# Patient Record
Sex: FEMALE | Race: WHITE | Employment: UNEMPLOYED | URBAN - NONMETROPOLITAN AREA
[De-identification: names, ages, dates, MRNs, and addresses within clinical notes are randomized per-mention and may not be internally consistent; named-entity substitution may affect disease eponyms.]

---

## 2018-08-04 ENCOUNTER — HOSPITAL ENCOUNTER (INPATIENT)
Age: 78
LOS: 6 days | Discharge: HOME | DRG: 439 | End: 2018-08-10
Attending: INTERNAL MEDICINE | Admitting: INTERNAL MEDICINE
Payer: MEDICARE

## 2018-08-04 PROBLEM — K85.90 ACUTE PANCREATITIS: Status: ACTIVE | Noted: 2018-08-04

## 2018-08-04 LAB
ALBUMIN SERPL-MCNC: 3.8 G/DL (ref 3.5–5.1)
ALP BLD-CCNC: 156 U/L (ref 38–126)
ALT SERPL-CCNC: 19 U/L (ref 11–66)
ANION GAP SERPL CALCULATED.3IONS-SCNC: 15 MEQ/L (ref 8–16)
AST SERPL-CCNC: 23 U/L (ref 5–40)
BASOPHILS # BLD: 0.6 %
BASOPHILS ABSOLUTE: 0 THOU/MM3 (ref 0–0.1)
BILIRUB SERPL-MCNC: 0.2 MG/DL (ref 0.3–1.2)
BUN BLDV-MCNC: 13 MG/DL (ref 7–22)
CALCIUM IONIZED: 1.2 MMOL/L (ref 1.12–1.32)
CALCIUM SERPL-MCNC: 9.9 MG/DL (ref 8.5–10.5)
CHLORIDE BLD-SCNC: 101 MEQ/L (ref 98–111)
CO2: 24 MEQ/L (ref 23–33)
CREAT SERPL-MCNC: 0.8 MG/DL (ref 0.4–1.2)
EKG ATRIAL RATE: 62 BPM
EKG P AXIS: 32 DEGREES
EKG P-R INTERVAL: 216 MS
EKG Q-T INTERVAL: 404 MS
EKG QRS DURATION: 72 MS
EKG QTC CALCULATION (BAZETT): 410 MS
EKG R AXIS: 8 DEGREES
EKG T AXIS: 35 DEGREES
EKG VENTRICULAR RATE: 62 BPM
EOSINOPHIL # BLD: 2.5 %
EOSINOPHILS ABSOLUTE: 0.2 THOU/MM3 (ref 0–0.4)
ERYTHROCYTE [DISTWIDTH] IN BLOOD BY AUTOMATED COUNT: 13.9 % (ref 11.5–14.5)
ERYTHROCYTE [DISTWIDTH] IN BLOOD BY AUTOMATED COUNT: 42 FL (ref 35–45)
GFR SERPL CREATININE-BSD FRML MDRD: 70 ML/MIN/1.73M2
GLUCOSE BLD-MCNC: 89 MG/DL (ref 70–108)
HCT VFR BLD CALC: 35.8 % (ref 37–47)
HEMOGLOBIN: 12 GM/DL (ref 12–16)
IMMATURE GRANS (ABS): 0.02 THOU/MM3 (ref 0–0.07)
IMMATURE GRANULOCYTES: 0.3 %
LIPASE: 359.7 U/L (ref 5.6–51.3)
LYMPHOCYTES # BLD: 41 %
LYMPHOCYTES ABSOLUTE: 3 THOU/MM3 (ref 1–4.8)
MCH RBC QN AUTO: 27.9 PG (ref 26–33)
MCHC RBC AUTO-ENTMCNC: 33.5 GM/DL (ref 32.2–35.5)
MCV RBC AUTO: 83.3 FL (ref 81–99)
MONOCYTES # BLD: 8.8 %
MONOCYTES ABSOLUTE: 0.6 THOU/MM3 (ref 0.4–1.3)
NUCLEATED RED BLOOD CELLS: 0 /100 WBC
PLATELET # BLD: 388 THOU/MM3 (ref 130–400)
PMV BLD AUTO: 10.3 FL (ref 9.4–12.4)
POTASSIUM REFLEX MAGNESIUM: 4.2 MEQ/L (ref 3.5–5.2)
RBC # BLD: 4.3 MILL/MM3 (ref 4.2–5.4)
SEG NEUTROPHILS: 46.8 %
SEGMENTED NEUTROPHILS ABSOLUTE COUNT: 3.4 THOU/MM3 (ref 1.8–7.7)
SODIUM BLD-SCNC: 140 MEQ/L (ref 135–145)
TOTAL PROTEIN: 7.4 G/DL (ref 6.1–8)
TRIGL SERPL-MCNC: 154 MG/DL (ref 0–199)
TROPONIN T: < 0.01 NG/ML
WBC # BLD: 7.2 THOU/MM3 (ref 4.8–10.8)

## 2018-08-04 PROCEDURE — 83690 ASSAY OF LIPASE: CPT

## 2018-08-04 PROCEDURE — 6360000002 HC RX W HCPCS: Performed by: INTERNAL MEDICINE

## 2018-08-04 PROCEDURE — 80053 COMPREHEN METABOLIC PANEL: CPT

## 2018-08-04 PROCEDURE — 82330 ASSAY OF CALCIUM: CPT

## 2018-08-04 PROCEDURE — 99223 1ST HOSP IP/OBS HIGH 75: CPT | Performed by: INTERNAL MEDICINE

## 2018-08-04 PROCEDURE — 2580000003 HC RX 258: Performed by: INTERNAL MEDICINE

## 2018-08-04 PROCEDURE — 84484 ASSAY OF TROPONIN QUANT: CPT

## 2018-08-04 PROCEDURE — 85025 COMPLETE CBC W/AUTO DIFF WBC: CPT

## 2018-08-04 PROCEDURE — 36415 COLL VENOUS BLD VENIPUNCTURE: CPT

## 2018-08-04 PROCEDURE — 84478 ASSAY OF TRIGLYCERIDES: CPT

## 2018-08-04 PROCEDURE — 93005 ELECTROCARDIOGRAM TRACING: CPT | Performed by: INTERNAL MEDICINE

## 2018-08-04 PROCEDURE — 1200000003 HC TELEMETRY R&B

## 2018-08-04 PROCEDURE — 6370000000 HC RX 637 (ALT 250 FOR IP): Performed by: INTERNAL MEDICINE

## 2018-08-04 RX ORDER — PROPRANOLOL HYDROCHLORIDE 20 MG/1
80 TABLET ORAL EVERY MORNING
Status: DISCONTINUED | OUTPATIENT
Start: 2018-08-05 | End: 2018-08-10 | Stop reason: HOSPADM

## 2018-08-04 RX ORDER — TRIAMTERENE AND HYDROCHLOROTHIAZIDE 37.5; 25 MG/1; MG/1
1 TABLET ORAL DAILY
Status: ON HOLD | COMMUNITY
End: 2018-08-09

## 2018-08-04 RX ORDER — M-VIT,TX,IRON,MINS/CALC/FOLIC 27MG-0.4MG
1 TABLET ORAL DAILY
Status: DISCONTINUED | OUTPATIENT
Start: 2018-08-05 | End: 2018-08-10 | Stop reason: HOSPADM

## 2018-08-04 RX ORDER — M-VIT,TX,IRON,MINS/CALC/FOLIC 27MG-0.4MG
1 TABLET ORAL DAILY
COMMUNITY

## 2018-08-04 RX ORDER — SODIUM CHLORIDE 0.9 % (FLUSH) 0.9 %
10 SYRINGE (ML) INJECTION PRN
Status: DISCONTINUED | OUTPATIENT
Start: 2018-08-04 | End: 2018-08-10 | Stop reason: HOSPADM

## 2018-08-04 RX ORDER — TRAZODONE HYDROCHLORIDE 50 MG/1
50 TABLET ORAL NIGHTLY
Status: DISCONTINUED | OUTPATIENT
Start: 2018-08-04 | End: 2018-08-10 | Stop reason: HOSPADM

## 2018-08-04 RX ORDER — MORPHINE SULFATE 4 MG/ML
4 INJECTION, SOLUTION INTRAMUSCULAR; INTRAVENOUS EVERY 4 HOURS PRN
Status: DISCONTINUED | OUTPATIENT
Start: 2018-08-04 | End: 2018-08-10 | Stop reason: HOSPADM

## 2018-08-04 RX ORDER — CHLORAL HYDRATE 500 MG
1 CAPSULE ORAL DAILY
COMMUNITY

## 2018-08-04 RX ORDER — ONDANSETRON 2 MG/ML
4 INJECTION INTRAMUSCULAR; INTRAVENOUS EVERY 6 HOURS PRN
Status: DISCONTINUED | OUTPATIENT
Start: 2018-08-04 | End: 2018-08-10 | Stop reason: HOSPADM

## 2018-08-04 RX ORDER — SODIUM CHLORIDE 0.9 % (FLUSH) 0.9 %
10 SYRINGE (ML) INJECTION EVERY 12 HOURS SCHEDULED
Status: DISCONTINUED | OUTPATIENT
Start: 2018-08-04 | End: 2018-08-10 | Stop reason: HOSPADM

## 2018-08-04 RX ORDER — MORPHINE SULFATE 2 MG/ML
2 INJECTION, SOLUTION INTRAMUSCULAR; INTRAVENOUS EVERY 4 HOURS PRN
Status: DISCONTINUED | OUTPATIENT
Start: 2018-08-04 | End: 2018-08-10 | Stop reason: HOSPADM

## 2018-08-04 RX ORDER — TRAMADOL HYDROCHLORIDE 50 MG/1
50 TABLET ORAL EVERY 6 HOURS PRN
COMMUNITY

## 2018-08-04 RX ORDER — PROPRANOLOL HYDROCHLORIDE 20 MG/1
40 TABLET ORAL NIGHTLY
Status: DISCONTINUED | OUTPATIENT
Start: 2018-08-04 | End: 2018-08-10 | Stop reason: HOSPADM

## 2018-08-04 RX ORDER — LORAZEPAM 0.5 MG/1
0.5 TABLET ORAL DAILY PRN
COMMUNITY

## 2018-08-04 RX ORDER — ACETAMINOPHEN 325 MG/1
650 TABLET ORAL EVERY 4 HOURS PRN
Status: DISCONTINUED | OUTPATIENT
Start: 2018-08-04 | End: 2018-08-10 | Stop reason: HOSPADM

## 2018-08-04 RX ORDER — PRIMIDONE 50 MG/1
100 TABLET ORAL NIGHTLY
Status: DISCONTINUED | OUTPATIENT
Start: 2018-08-04 | End: 2018-08-10 | Stop reason: HOSPADM

## 2018-08-04 RX ADMIN — PROPRANOLOL HYDROCHLORIDE 40 MG: 20 TABLET ORAL at 20:26

## 2018-08-04 RX ADMIN — Medication 10 ML: at 20:33

## 2018-08-04 RX ADMIN — MORPHINE SULFATE 4 MG: 4 INJECTION INTRAVENOUS at 20:26

## 2018-08-04 RX ADMIN — TRAZODONE HYDROCHLORIDE 50 MG: 50 TABLET ORAL at 20:26

## 2018-08-04 RX ADMIN — ENOXAPARIN SODIUM 40 MG: 40 INJECTION SUBCUTANEOUS at 20:33

## 2018-08-04 RX ADMIN — VITAMIN D, TAB 1000IU (100/BT) 5000 UNITS: 25 TAB at 20:26

## 2018-08-04 RX ADMIN — PRIMIDONE 100 MG: 50 TABLET ORAL at 20:26

## 2018-08-04 ASSESSMENT — PAIN DESCRIPTION - PAIN TYPE: TYPE: ACUTE PAIN

## 2018-08-04 ASSESSMENT — PAIN DESCRIPTION - ONSET: ONSET: ON-GOING

## 2018-08-04 ASSESSMENT — PAIN SCALES - GENERAL
PAINLEVEL_OUTOF10: 3
PAINLEVEL_OUTOF10: 8
PAINLEVEL_OUTOF10: 8

## 2018-08-04 ASSESSMENT — PAIN DESCRIPTION - FREQUENCY: FREQUENCY: CONTINUOUS

## 2018-08-04 ASSESSMENT — PAIN DESCRIPTION - LOCATION: LOCATION: BACK

## 2018-08-04 ASSESSMENT — PAIN DESCRIPTION - DESCRIPTORS: DESCRIPTORS: ACHING

## 2018-08-04 ASSESSMENT — PAIN DESCRIPTION - PROGRESSION: CLINICAL_PROGRESSION: NOT CHANGED

## 2018-08-04 ASSESSMENT — PAIN DESCRIPTION - ORIENTATION: ORIENTATION: LOWER;MID

## 2018-08-04 NOTE — H&P
History & Physical        Patient:  Cynthia Keller  YOB: 1940    MRN: [id number]     Acct: [de-identified]    PCP: Lexy Mclaughlin MD    Date of Admission: 8/4/2018    Date of Service: Pt seen/examined on 8/4/18 and Admitted to Inpatient with expected LOS greater than two midnights due to medical therapy. Chief Complaint:  Post-prandial back pain, elevated lipase      History Of Present Illness:  Ms. Olivia Salmon is a 78year old female with a past medical history of essential tremor s/p DBS 2014, nephrolithiasis, osteoporosis who presents as an OSH transfer from [facility] where she was being treated for acute pancreatitis. Her symptoms started on Thursday where she had worsening back pain severely in the evening hours. She took vicodin which did not provide relief. She thus presented to the  ED the following morning. Initial concerns were for nephrolithiasis given her history and right sided flank pain, however, UA was normal and CT AP did not demonstrate any stone. Lipase was mildly elevated at 380, thus without any other etiology they began empiric treatment for pancreatitis. She does not drink etoh, no family history of hypertriglyceridemia, no history of gall stones. Her lipase improved steadily, however, when they attempted to start a liquid diet her back pain returned significantly. They planned to do an MRCP, however, she has hardware that isn't MRI compatible. Thus she has been transferred here for GI eval and possible ERCP. She states that she takes naproxen daily. She denies any nausea or diaphoresis. She has lost 8 lbs recently, however, intentionally.      Past Medical History:          Diagnosis Date    Acute pancreatitis     Cervical dystonia     Receives Botox at [facility] Essential tremor     DBS    Fall as cause of accidental injury in home as place of occurrence     Kidney stone     Multiple transverse process fractures     right L1 and L3 transverse processes

## 2018-08-04 NOTE — PROGRESS NOTES
Patient to room from [facility] via squad at this time. Family at bedside. Patient alert and oriented x 3 with respirations easy and even. See vitals flow sheet and head to toe assessment.

## 2018-08-05 LAB
ALBUMIN SERPL-MCNC: 3.6 G/DL (ref 3.5–5.1)
ALP BLD-CCNC: 135 U/L (ref 38–126)
ALT SERPL-CCNC: 16 U/L (ref 11–66)
ANION GAP SERPL CALCULATED.3IONS-SCNC: 13 MEQ/L (ref 8–16)
AST SERPL-CCNC: 19 U/L (ref 5–40)
BACTERIA: ABNORMAL
BASOPHILS # BLD: 1 %
BASOPHILS ABSOLUTE: 0 THOU/MM3 (ref 0–0.1)
BILIRUB SERPL-MCNC: 0.2 MG/DL (ref 0.3–1.2)
BILIRUBIN URINE: ABNORMAL
BLOOD, URINE: NEGATIVE
BUN BLDV-MCNC: 15 MG/DL (ref 7–22)
CALCIUM SERPL-MCNC: 9.9 MG/DL (ref 8.5–10.5)
CASTS: ABNORMAL /LPF
CASTS: ABNORMAL /LPF
CHARACTER, URINE: CLEAR
CHLORIDE BLD-SCNC: 102 MEQ/L (ref 98–111)
CO2: 25 MEQ/L (ref 23–33)
COLOR: YELLOW
CREAT SERPL-MCNC: 0.7 MG/DL (ref 0.4–1.2)
CRYSTALS: ABNORMAL
EOSINOPHIL # BLD: 3.5 %
EOSINOPHILS ABSOLUTE: 0.2 THOU/MM3 (ref 0–0.4)
EPITHELIAL CELLS, UA: ABNORMAL /HPF
ERYTHROCYTE [DISTWIDTH] IN BLOOD BY AUTOMATED COUNT: 14 % (ref 11.5–14.5)
ERYTHROCYTE [DISTWIDTH] IN BLOOD BY AUTOMATED COUNT: 43.2 FL (ref 35–45)
GFR SERPL CREATININE-BSD FRML MDRD: 81 ML/MIN/1.73M2
GLUCOSE BLD-MCNC: 83 MG/DL (ref 70–108)
GLUCOSE, URINE: NEGATIVE MG/DL
HCT VFR BLD CALC: 32.4 % (ref 37–47)
HEMOGLOBIN: 10.7 GM/DL (ref 12–16)
ICTOTEST: NEGATIVE
IMMATURE GRANS (ABS): 0.01 THOU/MM3 (ref 0–0.07)
IMMATURE GRANULOCYTES: 0.2 %
KETONES, URINE: 40
LEUKOCYTE ESTERASE, URINE: NEGATIVE
LYMPHOCYTES # BLD: 46.2 %
LYMPHOCYTES ABSOLUTE: 2.3 THOU/MM3 (ref 1–4.8)
MCH RBC QN AUTO: 27.9 PG (ref 26–33)
MCHC RBC AUTO-ENTMCNC: 33 GM/DL (ref 32.2–35.5)
MCV RBC AUTO: 84.4 FL (ref 81–99)
MISCELLANEOUS LAB TEST RESULT: ABNORMAL
MONOCYTES # BLD: 9 %
MONOCYTES ABSOLUTE: 0.4 THOU/MM3 (ref 0.4–1.3)
NITRITE, URINE: NEGATIVE
NUCLEATED RED BLOOD CELLS: 0 /100 WBC
PH UA: 5
PLATELET # BLD: 319 THOU/MM3 (ref 130–400)
PMV BLD AUTO: 10.2 FL (ref 9.4–12.4)
POTASSIUM REFLEX MAGNESIUM: 4.1 MEQ/L (ref 3.5–5.2)
PROTEIN UA: NEGATIVE MG/DL
RBC # BLD: 3.84 MILL/MM3 (ref 4.2–5.4)
RBC URINE: ABNORMAL /HPF
RENAL EPITHELIAL, UA: ABNORMAL
SEG NEUTROPHILS: 40.1 %
SEGMENTED NEUTROPHILS ABSOLUTE COUNT: 2 THOU/MM3 (ref 1.8–7.7)
SODIUM BLD-SCNC: 140 MEQ/L (ref 135–145)
SPECIFIC GRAVITY UA: 1.02 (ref 1–1.03)
TOTAL PROTEIN: 6.3 G/DL (ref 6.1–8)
UROBILINOGEN, URINE: 0.2 EU/DL
WBC # BLD: 4.9 THOU/MM3 (ref 4.8–10.8)
WBC UA: ABNORMAL /HPF
YEAST: ABNORMAL

## 2018-08-05 PROCEDURE — 6370000000 HC RX 637 (ALT 250 FOR IP): Performed by: INTERNAL MEDICINE

## 2018-08-05 PROCEDURE — 2580000003 HC RX 258: Performed by: INTERNAL MEDICINE

## 2018-08-05 PROCEDURE — 1200000003 HC TELEMETRY R&B

## 2018-08-05 PROCEDURE — 81001 URINALYSIS AUTO W/SCOPE: CPT

## 2018-08-05 PROCEDURE — 80053 COMPREHEN METABOLIC PANEL: CPT

## 2018-08-05 PROCEDURE — 85025 COMPLETE CBC W/AUTO DIFF WBC: CPT

## 2018-08-05 PROCEDURE — 93010 ELECTROCARDIOGRAM REPORT: CPT | Performed by: INTERNAL MEDICINE

## 2018-08-05 PROCEDURE — 36415 COLL VENOUS BLD VENIPUNCTURE: CPT

## 2018-08-05 PROCEDURE — 99232 SBSQ HOSP IP/OBS MODERATE 35: CPT | Performed by: INTERNAL MEDICINE

## 2018-08-05 PROCEDURE — 6360000002 HC RX W HCPCS: Performed by: INTERNAL MEDICINE

## 2018-08-05 RX ORDER — SODIUM CHLORIDE, SODIUM LACTATE, POTASSIUM CHLORIDE, CALCIUM CHLORIDE 600; 310; 30; 20 MG/100ML; MG/100ML; MG/100ML; MG/100ML
INJECTION, SOLUTION INTRAVENOUS CONTINUOUS
Status: DISCONTINUED | OUTPATIENT
Start: 2018-08-05 | End: 2018-08-08

## 2018-08-05 RX ADMIN — Medication 10 ML: at 22:10

## 2018-08-05 RX ADMIN — SODIUM CHLORIDE, POTASSIUM CHLORIDE, SODIUM LACTATE AND CALCIUM CHLORIDE: 600; 310; 30; 20 INJECTION, SOLUTION INTRAVENOUS at 08:46

## 2018-08-05 RX ADMIN — VITAMIN D, TAB 1000IU (100/BT) 2000 UNITS: 25 TAB at 08:54

## 2018-08-05 RX ADMIN — TRAZODONE HYDROCHLORIDE 50 MG: 50 TABLET ORAL at 22:10

## 2018-08-05 RX ADMIN — Medication 10 ML: at 09:00

## 2018-08-05 RX ADMIN — PROPRANOLOL HYDROCHLORIDE 80 MG: 20 TABLET ORAL at 08:53

## 2018-08-05 RX ADMIN — PROPRANOLOL HYDROCHLORIDE 40 MG: 20 TABLET ORAL at 22:10

## 2018-08-05 RX ADMIN — PRIMIDONE 100 MG: 50 TABLET ORAL at 22:09

## 2018-08-05 RX ADMIN — SERTRALINE 150 MG: 100 TABLET, FILM COATED ORAL at 08:53

## 2018-08-05 RX ADMIN — ENOXAPARIN SODIUM 40 MG: 40 INJECTION SUBCUTANEOUS at 13:30

## 2018-08-05 RX ADMIN — VITAMIN D, TAB 1000IU (100/BT) 5000 UNITS: 25 TAB at 22:09

## 2018-08-05 RX ADMIN — SODIUM CHLORIDE, POTASSIUM CHLORIDE, SODIUM LACTATE AND CALCIUM CHLORIDE: 600; 310; 30; 20 INJECTION, SOLUTION INTRAVENOUS at 22:54

## 2018-08-05 RX ADMIN — SODIUM CHLORIDE, POTASSIUM CHLORIDE, SODIUM LACTATE AND CALCIUM CHLORIDE: 600; 310; 30; 20 INJECTION, SOLUTION INTRAVENOUS at 16:39

## 2018-08-05 ASSESSMENT — PAIN SCALES - GENERAL
PAINLEVEL_OUTOF10: 0

## 2018-08-05 NOTE — PLAN OF CARE
Problem: Pain:  Goal: Pain level will decrease  Pain level will decrease   Outcome: Ongoing  No pain this shift, No nausea  Goal: Control of acute pain  Control of acute pain   Outcome: Met This Shift    Goal: Control of chronic pain  Control of chronic pain   Outcome: Met This Shift    Goal: Patient's pain/discomfort is manageable  Patient's pain/discomfort is manageable   Outcome: Met This Shift      Problem: Safety:  Goal: Free from accidental physical injury  Free from accidental physical injury   Outcome: Ongoing  Fall prevention taken     Problem: Daily Care:  Goal: Daily care needs are met  Daily care needs are met   Outcome: Ongoing  Care needs met, Assitance with bath and care as needed     Problem: Skin Integrity:  Goal: Skin integrity will stabilize  Skin integrity will stabilize   Outcome: Met This Shift      Problem: Discharge Planning:  Goal: Patients continuum of care needs are met  Patients continuum of care needs are met   Outcome: Ongoing  Continued with care needs. Family supportive     Problem: Neurological  Goal: Maximum potential motor/sensory/cognitive function  Outcome: Ongoing      Problem: Cardiovascular  Goal: No DVT, peripheral vascular complications  Outcome: Ongoing      Problem: Respiratory  Goal: No pulmonary complications  Outcome: Ongoing  No shortness of breath. Problem: Nutrition  Goal: Optimal nutrition therapy  Outcome: Ongoing  Npo except ice chips    Comments: Continued with care .  Npo at this time

## 2018-08-05 NOTE — CONSULTS
Consult Note    Patient: Kendra Salazar : 1940  Med Rec#: [id number] Acc#: [id number]   Provider Performing Procedure: Eugenia Spnan  Primary Care Physician: Josef Florence MD     No chief complaint on file. Consulted by the hospitalist for pancreatitis and patient was transferred to the hospital for possible ERCP. Patient had colonoscopy two years ago by another GI specialist but requested to be seen by me in the presence of nurse Jenn Santizo . Patient has epigastric pain radiating to the back , denied fever or chills , diarrhea , jaundice , weight loss or other GI complains . ROS  General Denies any fever or chills  HEENT Denies any diplopia, tinnitus or vertigo  Resp Denies SOB, chest pain, hemoptysis  Cardiac Denies any chest pain, palpitations, claudication or edema  GI Denies any melena, hematochezia, hematemesis or pyrosis   Denies any frequency, urgency, hesitancy or incontinence  Heme Denies bruising or bleeding easily  Endocrine Denies any history of diabetes or thyroid disease  Neuro Denies any focal motor or sensory deficits  Psychiatric Denies anxiety, depression, suicidal ideation  Skin Denies rashes, itching, open sores    MEDICAL HISTORY   has a past medical history of Acute pancreatitis; Cervical dystonia; Essential tremor; Fall as cause of accidental injury in home as place of occurrence; Kidney stone; Multiple transverse process fractures; Osteoporosis; Right rib fracture; and Vertebral compression fracture. SURGICAL HISTORY   has a past surgical history that includes Hysterectomy; Total knee arthroplasty (Bilateral); Deep brain stimulator placement; Tonsillectomy; and joint replacement (Bilateral).   Additional information:       ALLERGIES   Allergies as of 2018 - Review Complete 2018   Allergen Reaction Noted    Shellfish-derived products  2018     Additional information:       MEDICATIONS   Coumadin Use Last 7 Days [x]No []Yes  Antiplatelet drug therapy use last 7 days  [x]No []Yes  Other anticoagulant use last 7 days  [x]No []Yes  Current Facility-Administered Medications   Medication Dose Route Frequency Provider Last Rate Last Dose    lactated ringers infusion   Intravenous Continuous Gayathri Walsh  mL/hr at 08/05/18 1639      sodium chloride flush 0.9 % injection 10 mL  10 mL Intravenous 2 times per day Gayathri Walsh MD   10 mL at 08/05/18 0900    sodium chloride flush 0.9 % injection 10 mL  10 mL Intravenous PRN Gayathri Walsh MD        acetaminophen (TYLENOL) tablet 650 mg  650 mg Oral Q4H PRN aGyathri Walsh MD        ondansetron [facility]) injection 4 mg  4 mg Intravenous Q6H PRN Gayathri Walsh MD        enoxaparin (LOVENOX) injection 40 mg  40 mg Subcutaneous Daily Gayathri Walsh MD   40 mg at 08/05/18 1330    morphine injection 2 mg  2 mg Intravenous Q4H PRN Gayathri Walsh MD        Or    morphine (PF) injection 4 mg  4 mg Intravenous Q4H PRN Gayathri Walsh MD   4 mg at 08/04/18 2026    therapeutic multivitamin-minerals 1 tablet  1 tablet Oral Daily Gayathri Walsh MD        primidone (MYSOLINE) tablet 100 mg  100 mg Oral Nightly Gayathri Walsh MD   100 mg at 08/04/18 2026    propranolol (INDERAL) tablet 80 mg  80 mg Oral QAM Gayathri Walsh MD   80 mg at 08/05/18 8796    traZODone (DESYREL) tablet 50 mg  50 mg Oral Nightly Gayathri Walsh MD   50 mg at 08/04/18 2026    vitamin D (CHOLECALCIFEROL) tablet 5,000 Units  5,000 Units Oral BID Gayathri Walsh MD   2,000 Units at 08/05/18 0854    sertraline (ZOLOFT) tablet 150 mg  150 mg Oral Daily Gayathri Walsh MD   150 mg at 08/05/18 3469    propranolol (INDERAL) tablet 40 mg  40 mg Oral Nightly Gayathri aWlsh MD   40 mg at 08/04/18 2026     Additional information:       PHYSICAL:   Heart:  [x]Regular rate and rhythm  []Other:    Lungs:  [x]Clear    []Other:    Abdomen: [x]Soft    []Other:    Mental Status: [x]Alert &

## 2018-08-05 NOTE — PROGRESS NOTES
Hospitalist Progress Note    Patient:  Josefina Dumont      Unit/Bed:6K-21/021-A    YOB: 1940    MRN: [id number]       Acct: [de-identified]     PCP: Kelly Brambila MD    Date of Admission: 8/4/2018    Chief Complaint: Back pain, stomach fullness    Hospital Course: Ms. Forrest Posada is a 78year old female with a past medical history of essential tremor s/p DBS 2014, nephrolithiasis, osteoporosis who presents as an OSH transfer from [facility] where she was being treated for acute pancreatitis. Her pain returned on resumption of diet and since she couldn't undergo MRCP due to hardware she was transferred to [facility] for for endoscopic evaluation by GI. Subjective: Some back pain overnight treated with morphine. Lipase elevated to 359. No other complaints. Medications:  Reviewed    Infusion Medications    lactated ringers 125 mL/hr at 08/05/18 1639     Scheduled Medications    sodium chloride flush  10 mL Intravenous 2 times per day    enoxaparin  40 mg Subcutaneous Daily    therapeutic multivitamin-minerals  1 tablet Oral Daily    primidone  100 mg Oral Nightly    propranolol  80 mg Oral QAM    traZODone  50 mg Oral Nightly    vitamin D  5,000 Units Oral BID    sertraline  150 mg Oral Daily    propranolol  40 mg Oral Nightly     PRN Meds: sodium chloride flush, acetaminophen, ondansetron, morphine **OR** morphine      Intake/Output Summary (Last 24 hours) at 08/05/18 1708  Last data filed at 08/05/18 1448   Gross per 24 hour   Intake              775 ml   Output              500 ml   Net              275 ml       Diet:  Diet NPO Effective Now Exceptions are: Ice Chips    Exam:  BP (!) 141/65   Pulse 55   Temp 98.1 °F (36.7 °C) (Oral)   Resp 16   Ht 5' 6\" (1.676 m)   Wt 164 lb 1.6 oz (74.4 kg)   LMP  (LMP Unknown)   SpO2 97%   BMI 26.49 kg/m²     General appearance:  No apparent distress, appears stated age and cooperative.   HEENT:  Normal cephalic, atraumatic without obvious deformity. Pupils equal, round, and reactive to light. Extra ocular muscles intact. Conjunctivae/corneas clear. Neck: Supple, with full range of motion. No jugular venous distention. Trachea midline. Respiratory:  Normal respiratory effort. Clear to auscultation, bilaterally without Rales/Wheezes/Rhonchi. Cardiovascular:  Regular rate and rhythm with normal S1/S2 without murmurs, rubs or gallops. Abdomen: Soft, non-tender, non-distended with normal bowel sounds. Musculoskeletal:  No clubbing, cyanosis or edema bilaterally. Full range of motion without deformity. Skin: Skin color, texture, turgor normal.  No rashes or lesions. Neurologic:  Neurovascularly intact without any focal sensory/motor deficits. Cranial nerves: II-XII intact, grossly non-focal. Head with bobbing tremor. Psychiatric:  Alert and oriented, thought content appropriate, normal insight  Capillary Refill: Brisk,< 3 seconds   Peripheral Pulses: +2 palpable, equal bilaterally          Labs:   Recent Labs      08/04/18 1906 08/05/18 0527   WBC  7.2  4.9   HGB  12.0  10.7*   HCT  35.8*  32.4*   PLT  388  319     Recent Labs      08/04/18 1906 08/05/18 0527   NA  140  140   K  4.2  4.1   CL  101  102   CO2  24  25   BUN  13  15   CREATININE  0.8  0.7   CALCIUM  9.9  9.9     Recent Labs      08/04/18 1906 08/05/18 0527   AST  23  19   ALT  19  16   BILITOT  0.2*  0.2*   ALKPHOS  156*  135*     No results for input(s): INR in the last 72 hours. No results for input(s): Deward Feil in the last 72 hours.     Urinalysis:    Lab Results   Component Value Date    NITRU NEGATIVE 08/05/2018    WBCUA 0-2 08/05/2018    BACTERIA NONE 08/05/2018    RBCUA 0-2 08/05/2018    BLOODU NEGATIVE 08/05/2018    SPECGRAV 1.016 08/05/2018       Radiology:  No orders to display       Diet: Diet NPO Effective Now Exceptions are: Ice Chips    DVT prophylaxis: [x] Lovenox                                 [] SCDs [] SQ Heparin                                 [] Encourage ambulation           [] Already on Anticoagulation     Disposition:    [x] Home       [] TCU       [] Rehab       [] Psych       [] SNF       [] [place]       [] Other-    Code Status: Full Code    Assessment/Plan:    Anticipated Discharge in : 2-3 days    Active Hospital Problems    Diagnosis Date Noted    Acute pancreatitis [K85.90] 08/04/2018    Kidney stone [N20.0]     Osteoporosis [M81.0]        Acute Pancreatitis: Would suspect nephrolithiasis given right flank pan, however, OSH CTAP unremarkable for stone and UA without blood and unlikely uremic stone. Pain seems to be related to PO intake. Lipase elevated to 380 at OSH and elevated again here to 359. Unable to do MRCP at OSH due to hardware. OSH RUQ US reportedly with GB sludge. Also takes NSAIDs daily so query gastritis/ulcer? . EKG/trop negative for atypical chest pain/MI  - consult GI   - NPO  - PRN morphine  - lipase elevated again so resumed IVF's with  cc/hr  - prn zofran     History of Essential Tremor s/p DBS:   - Continue primidone  - continue propranolol     History of osteoporosis with fragility fractures:  -continue Vitamin D     Depression/anxiety: Continue Zoloft    Electronically signed by Cleve Arteaga MD on 8/5/2018 at 5:08 PM

## 2018-08-05 NOTE — PLAN OF CARE
Problem: Pain:  Goal: Pain level will decrease  Pain level will decrease   Outcome: Ongoing  Patient c/o right abdominal pain radiating into her right flank area. 8/10. PRN Morphine given, with effect. Goal: Control of acute pain  Control of acute pain   Outcome: Ongoing  Patient c/o right abdominal pain radiating into her right flank area. 8/10. PRN Morphine given, with effect. Goal: Control of chronic pain  Control of chronic pain   Outcome: Ongoing  Patient c/o right abdominal pain radiating into her right flank area. 8/10. PRN Morphine given, with effect. Goal: Patient's pain/discomfort is manageable  Patient's pain/discomfort is manageable   Outcome: Ongoing  Patient c/o right abdominal pain radiating into her right flank area. 8/10. PRN Morphine given, with effect. Problem: Safety:  Goal: Free from accidental physical injury  Free from accidental physical injury   Outcome: Ongoing  No accidental physical harm/injury noted. Problem: Daily Care:  Goal: Daily care needs are met  Daily care needs are met   Outcome: Ongoing  Patient independent with ADL's. Uses call light to request assistance if needed. Problem: Skin Integrity:  Goal: Skin integrity will stabilize  Skin integrity will stabilize   Outcome: Ongoing  No new skin integrity issues noted. Problem: Discharge Planning:  Goal: Patients continuum of care needs are met  Patients continuum of care needs are met   Outcome: Ongoing  Patient plans to return home with  at discharge. Problem: Neurological  Goal: Maximum potential motor/sensory/cognitive function  Outcome: Ongoing  Alert and oriented x4. Problem: Cardiovascular  Goal: No DVT, peripheral vascular complications  Outcome: Ongoing  No s/s of DVT noted. Currently on SQ Lovenox for DVT prophylaxis. Problem: Respiratory  Goal: No pulmonary complications  Outcome: Ongoing  Respirations easy and unlabored. Pulse ox >96% on RA.     Problem: GI  Goal: No bowel complications  Outcome: Ongoing  No bowel complications noted. Problem:   Goal: Adequate urinary output  Outcome: Ongoing  Strict I&O's being monitored. No imbalances noted. Problem: Nutrition  Goal: Optimal nutrition therapy  Outcome: Ongoing  Diet adequate. Problem: Musculor/Skeletal Functional Status  Goal: Absence of falls  Outcome: Ongoing  Falling star program in place. Call light within reach. Bed alarm on at this time. Bed rails up x2. Over bed table within reach. Personal possessions in reach. Comments: Care plan reviewed with patient and family. Patient and family verbalize understanding of the plan of care and contribute to goal setting.

## 2018-08-06 LAB
ALBUMIN SERPL-MCNC: 3.2 G/DL (ref 3.5–5.1)
ALP BLD-CCNC: 126 U/L (ref 38–126)
ALT SERPL-CCNC: 15 U/L (ref 11–66)
AMYLASE: 66 U/L (ref 20–104)
ANION GAP SERPL CALCULATED.3IONS-SCNC: 22 MEQ/L (ref 8–16)
AST SERPL-CCNC: 20 U/L (ref 5–40)
BASOPHILS # BLD: 0.8 %
BASOPHILS ABSOLUTE: 0 THOU/MM3 (ref 0–0.1)
BILIRUB SERPL-MCNC: 0.2 MG/DL (ref 0.3–1.2)
BUN BLDV-MCNC: 15 MG/DL (ref 7–22)
CALCIUM SERPL-MCNC: 9.4 MG/DL (ref 8.5–10.5)
CHLORIDE BLD-SCNC: 103 MEQ/L (ref 98–111)
CO2: 16 MEQ/L (ref 23–33)
CREAT SERPL-MCNC: 0.7 MG/DL (ref 0.4–1.2)
EOSINOPHIL # BLD: 2.5 %
EOSINOPHILS ABSOLUTE: 0.1 THOU/MM3 (ref 0–0.4)
ERYTHROCYTE [DISTWIDTH] IN BLOOD BY AUTOMATED COUNT: 13.7 % (ref 11.5–14.5)
ERYTHROCYTE [DISTWIDTH] IN BLOOD BY AUTOMATED COUNT: 41.7 FL (ref 35–45)
GFR SERPL CREATININE-BSD FRML MDRD: 81 ML/MIN/1.73M2
GLUCOSE BLD-MCNC: 64 MG/DL (ref 70–108)
HCT VFR BLD CALC: 31.4 % (ref 37–47)
HEMOGLOBIN: 10.3 GM/DL (ref 12–16)
IMMATURE GRANS (ABS): 0.01 THOU/MM3 (ref 0–0.07)
IMMATURE GRANULOCYTES: 0.2 %
LIPASE: 50.4 U/L (ref 5.6–51.3)
LYMPHOCYTES # BLD: 42.9 %
LYMPHOCYTES ABSOLUTE: 2.1 THOU/MM3 (ref 1–4.8)
MCH RBC QN AUTO: 27.3 PG (ref 26–33)
MCHC RBC AUTO-ENTMCNC: 32.8 GM/DL (ref 32.2–35.5)
MCV RBC AUTO: 83.3 FL (ref 81–99)
MONOCYTES # BLD: 10.6 %
MONOCYTES ABSOLUTE: 0.5 THOU/MM3 (ref 0.4–1.3)
NUCLEATED RED BLOOD CELLS: 0 /100 WBC
PLATELET # BLD: 286 THOU/MM3 (ref 130–400)
PMV BLD AUTO: 10 FL (ref 9.4–12.4)
POTASSIUM REFLEX MAGNESIUM: 3.7 MEQ/L (ref 3.5–5.2)
RBC # BLD: 3.77 MILL/MM3 (ref 4.2–5.4)
SEG NEUTROPHILS: 43 %
SEGMENTED NEUTROPHILS ABSOLUTE COUNT: 2.1 THOU/MM3 (ref 1.8–7.7)
SODIUM BLD-SCNC: 141 MEQ/L (ref 135–145)
TOTAL PROTEIN: 6 G/DL (ref 6.1–8)
WBC # BLD: 4.8 THOU/MM3 (ref 4.8–10.8)

## 2018-08-06 PROCEDURE — 2580000003 HC RX 258: Performed by: INTERNAL MEDICINE

## 2018-08-06 PROCEDURE — 99232 SBSQ HOSP IP/OBS MODERATE 35: CPT | Performed by: INTERNAL MEDICINE

## 2018-08-06 PROCEDURE — 85025 COMPLETE CBC W/AUTO DIFF WBC: CPT

## 2018-08-06 PROCEDURE — G8979 MOBILITY GOAL STATUS: HCPCS

## 2018-08-06 PROCEDURE — G8980 MOBILITY D/C STATUS: HCPCS

## 2018-08-06 PROCEDURE — 6370000000 HC RX 637 (ALT 250 FOR IP): Performed by: INTERNAL MEDICINE

## 2018-08-06 PROCEDURE — G8978 MOBILITY CURRENT STATUS: HCPCS

## 2018-08-06 PROCEDURE — 97161 PT EVAL LOW COMPLEX 20 MIN: CPT

## 2018-08-06 PROCEDURE — 36415 COLL VENOUS BLD VENIPUNCTURE: CPT

## 2018-08-06 PROCEDURE — 83690 ASSAY OF LIPASE: CPT

## 2018-08-06 PROCEDURE — 97110 THERAPEUTIC EXERCISES: CPT

## 2018-08-06 PROCEDURE — 82150 ASSAY OF AMYLASE: CPT

## 2018-08-06 PROCEDURE — 1200000003 HC TELEMETRY R&B

## 2018-08-06 PROCEDURE — 80053 COMPREHEN METABOLIC PANEL: CPT

## 2018-08-06 PROCEDURE — 6360000002 HC RX W HCPCS: Performed by: INTERNAL MEDICINE

## 2018-08-06 RX ADMIN — MULTIPLE VITAMINS W/ MINERALS TAB 1 TABLET: TAB at 08:02

## 2018-08-06 RX ADMIN — PRIMIDONE 100 MG: 50 TABLET ORAL at 21:45

## 2018-08-06 RX ADMIN — SODIUM CHLORIDE, POTASSIUM CHLORIDE, SODIUM LACTATE AND CALCIUM CHLORIDE: 600; 310; 30; 20 INJECTION, SOLUTION INTRAVENOUS at 18:07

## 2018-08-06 RX ADMIN — SODIUM CHLORIDE, POTASSIUM CHLORIDE, SODIUM LACTATE AND CALCIUM CHLORIDE: 600; 310; 30; 20 INJECTION, SOLUTION INTRAVENOUS at 08:05

## 2018-08-06 RX ADMIN — PROPRANOLOL HYDROCHLORIDE 80 MG: 20 TABLET ORAL at 08:02

## 2018-08-06 RX ADMIN — TRAZODONE HYDROCHLORIDE 50 MG: 50 TABLET ORAL at 21:45

## 2018-08-06 RX ADMIN — VITAMIN D, TAB 1000IU (100/BT) 5000 UNITS: 25 TAB at 08:01

## 2018-08-06 RX ADMIN — PROPRANOLOL HYDROCHLORIDE 40 MG: 20 TABLET ORAL at 21:46

## 2018-08-06 RX ADMIN — VITAMIN D, TAB 1000IU (100/BT) 5000 UNITS: 25 TAB at 21:46

## 2018-08-06 RX ADMIN — ENOXAPARIN SODIUM 40 MG: 40 INJECTION SUBCUTANEOUS at 08:01

## 2018-08-06 RX ADMIN — SERTRALINE 150 MG: 100 TABLET, FILM COATED ORAL at 08:02

## 2018-08-06 RX ADMIN — Medication 10 ML: at 21:46

## 2018-08-06 ASSESSMENT — PAIN SCALES - GENERAL
PAINLEVEL_OUTOF10: 0

## 2018-08-06 NOTE — PLAN OF CARE
125/hr. Problem:   Goal: Adequate urinary output  Outcome: Ongoing  Patient on strict I&O's. No imbalance noted. Problem: Nutrition  Goal: Optimal nutrition therapy  Outcome: Ongoing  Patient remain NPO at this times. May have ice chips. Problem: Musculor/Skeletal Functional Status  Goal: Absence of falls  Outcome: Ongoing  Falling star program in place. Call light within reach. Bed alarm on at this time. Bed rails up x2. Over bed table within reach. Personal possessions in reach. Comments: Care plan reviewed with patient and family. Patient and family verbalize understanding of the plan of care and contribute to goal setting.

## 2018-08-06 NOTE — PROGRESS NOTES
[facility]  INPATIENT PHYSICAL THERAPY  EVALUATION  STRZ RENAL TELEMETRY 6K - 6K-21/021-A    Time In: 0804  Time Out: 6516  Timed Code Treatment Minutes: 10 Minutes  Minutes: 29          Date: 2018  Patient Name: Olivia Huffman,  Gender:  female        MRN: [id number]  : 1940  (78 y.o.)      Referring Practitioner: Barbara Chase MD  Diagnosis: Acute pancreatitis  Additional Pertinent Hx: Ms. Jb Clemons is a 78year old female with a past medical history of essential tremor s/p DBS , nephrolithiasis, osteoporosis who presents as an OSH transfer from [facility] where she was being treated for acute pancreatitis. Her symptoms started on Thursday where she had worsening back pain severely in the evening hours. She took vicodin which did not provide relief. She thus presented to the  ED the following morning. Initial concerns were for nephrolithiasis given her history and right sided flank pain, however, UA was normal and CT AP did not demonstrate any stone. Lipase was mildly elevated at 380, thus without any other etiology they began empiric treatment for pancreatitis. She does not drink etoh, no family history of hypertriglyceridemia, no history of gall stones. Her lipase improved steadily, however, when they attempted to start a liquid diet her back pain returned significantly. They planned to do an MRCP, however, she has hardware that isn't MRI compatible. Thus she has been transferred here for GI eval and possible ERCP. She states that she takes naproxen daily. She denies any nausea or diaphoresis. She has lost 8 lbs recently, however, intentionally.       Past Medical History:   Diagnosis Date    Acute pancreatitis     Cervical dystonia     Receives Botox at [facility] Essential tremor     DBS    Fall as cause of accidental injury in home as place of occurrence     Kidney stone     Multiple transverse process fractures     right L1 and L3 transverse processes    Osteoporosis     Right rib fracture     5,6,7,9,10    Vertebral compression fracture     superior endplate of T2 and T3     Past Surgical History:   Procedure Laterality Date    DEEP BRAIN STIMULATOR PLACEMENT      HYSTERECTOMY      JOINT REPLACEMENT Bilateral     knee replacement    TONSILLECTOMY      TOTAL KNEE ARTHROPLASTY Bilateral        Restrictions/Precautions:  General Precautions        Subjective:  Chart Reviewed: Yes  Patient assessed for rehabilitation services?: Yes  Family / Caregiver Present: No  Subjective: RN approved session, pt is pleasant and agreeable. General:  Overall Orientation Status: Within Functional Limits  Follows Commands: Within Functional Limits    Vision: Within Functional Limits    Hearing: Within functional limits       Pain:  Denies. Social/Functional History:    Lives With: Spouse  Type of Home: House  Home Layout: One level  Home Access: Stairs to enter without rails  Entrance Stairs - Number of Steps: 1  Home Equipment: Rolling walker       Ambulation Assistance: Independent    Active : Yes  Occupation: Retired  Additional Comments: Pt amb without AD, active at home      Objective:       RLE AROM: WFL       LLE AROM : WFL        B LE's grossly 4+/5       RLE Tone: Normotonic  LLE Tone: Normotonic       Supine to Sit: Supervision    Transfers  Sit to Stand: Supervision  Stand to sit: Supervision       Ambulation 1  Surface: level tile  Device: No Device  Assistance: Stand by assistance  Quality of Gait: Pt amb with decreased howie and speed, good step length, steady without LOB. Distance: >500 feet  Comments: Pt states it feels good to walk, pt very appreciative            Exercises:  Comments: Pt performs supine B LE AROM: ankle pumps, heel slides, hip abd/add and seateed LAQ while edge of bed, all x 10 reps to increase strength for improved gait.            Activity Tolerance:  Activity Tolerance: Patient Tolerated treatment well    Treatment without Stair Climbing Raw Score : 16  AM-PAC Inpatient without Stair Climbing T-Scale Score : 45.54  Mobility Inpatient CMS 0-100% Score: 40.64  Mobility Inpatient without Stair CMS G-Code Modifier : CK

## 2018-08-06 NOTE — PROGRESS NOTES
Progress Note    Patient:  Kendra Salazar      Unit/Bed:6K-21/021-A    YOB: 1940    MRN: [id number]     Acct: [de-identified]     Admit date: 8/4/2018    Patient Seen, Chart, Consults notes, Labs, Radiology studies reviewed. SUBJECTIVE:   Day 2 of stay with back pain and diagnosed with acute pancreatitis and most recent (in last 24 hours) has had improvement with no abdominal pain. Scheduled for EUS tomorrow by GI consult and subsequent ERCP if indicated. remains NPO and denied any fever or chills. All other ROS negative except noted in HPI    Past, Family, Social History unchanged from admission. Diet:  Diet NPO Effective Now Exceptions are: Ice Chips    Medications:  Scheduled Meds:   sodium chloride flush  10 mL Intravenous 2 times per day    enoxaparin  40 mg Subcutaneous Daily    therapeutic multivitamin-minerals  1 tablet Oral Daily    primidone  100 mg Oral Nightly    propranolol  80 mg Oral QAM    traZODone  50 mg Oral Nightly    vitamin D  5,000 Units Oral BID    sertraline  150 mg Oral Daily    propranolol  40 mg Oral Nightly     Continuous Infusions:   lactated ringers 125 mL/hr at 08/06/18 0805     PRN Meds:sodium chloride flush, acetaminophen, ondansetron, morphine **OR** morphine    OBJECTIVE:    CBC:   Recent Labs      08/04/18 1906 08/05/18 0527 08/06/18   0605   WBC  7.2  4.9  4.8   HGB  12.0  10.7*  10.3*   PLT  388  319  286     BMP:  Recent Labs      08/04/18 1906 08/05/18   0527  08/06/18   0605   NA  140  140  141   K  4.2  4.1  3.7   CL  101  102  103   CO2  24  25  16*   BUN  13  15  15   CREATININE  0.8  0.7  0.7   GLUCOSE  89  83  64*     Calcium:  Recent Labs      08/06/18 0605   CALCIUM  9.4     Ionized Calcium:No results for input(s): IONCA in the last 72 hours. Magnesium:No results for input(s): MG in the last 72 hours. Phosphorus:No results for input(s): PHOS in the last 72 hours.   BNP:No results for input(s): BNP in the last 72 hours.  Glucose:No results for input(s): POCGLU in the last 72 hours. HgbA1C: No results for input(s): LABA1C in the last 72 hours. INR: No results for input(s): INR in the last 72 hours. Hepatic: Recent Labs      08/06/18   0605   ALKPHOS  126   ALT  15   AST  20   PROT  6.0*   BILITOT  0.2*   LABALBU  3.2*     Amylase and Lipase:  Recent Labs      08/06/18   0603   AMYLASE  66     Lactic Acid: No results for input(s): LACTA in the last 72 hours. Troponin: Recent Labs      08/04/18   1906   TROPONINT  < 0.010     BNP: No results for input(s): BNP in the last 72 hours. Lipids: Recent Labs      08/04/18   1906   TRIG  154     ABGs: No results found for: PH, PCO2, PO2, HCO3, O2SAT    Radiology reports as per the Radiologist  Radiology: No results found. Physical Exam:  Vitals: BP (!) 151/67   Pulse 69   Temp 98.9 °F (37.2 °C) (Oral)   Resp 18   Ht 5' 6\" (1.676 m)   Wt 166 lb 1.6 oz (75.3 kg)   LMP  (LMP Unknown)   SpO2 97%   BMI 26.81 kg/m²   24 hour intake/output:  Intake/Output Summary (Last 24 hours) at 08/06/18 1735  Last data filed at 08/06/18 0532   Gross per 24 hour   Intake             1767 ml   Output              500 ml   Net             1267 ml     Last 3 weights:   Wt Readings from Last 3 Encounters:   08/06/18 166 lb 1.6 oz (75.3 kg)   08/13/15 189 lb (85.7 kg)   08/06/15 193 lb 14.4 oz (88 kg)       General appearance - alert, well appearing, and in no distress  HEENT: Normocephalic, Atraumatic, Conjuctiva pink, PERRL, Oral mucosa normal, Lips, teeth and gums normal, Trachea midline, Thyroid normal and No noted lymphadenopathy  Chest - clear to auscultation, no wheezes, rales or rhonchi, symmetric air entry  Cardiovascular - normal rate, regular rhythm, normal S1, S2, no murmurs, rubs, clicks or gallops  Abdomen - soft, nontender, nondistended, no masses or organomegaly   Neurological - Alert and oriented, Normal speech, No focal findings or movement disorder noted and Motor and sensory

## 2018-08-06 NOTE — CARE COORDINATION
8/6/18, 7:44 AM      Nish Gonzalez       Admitted from: [facility], [facility] 8/4/2018/ 1640 Hospital day: 2   Location: 6-21/021-A Reason for admit: Acute pancreatitis, unspecified complication status, unspecified pancreatitis type [K85.90] Status: IP  Admit order signed?: yes  PMH:  has a past medical history of Acute pancreatitis; Cervical dystonia; Essential tremor; Fall as cause of accidental injury in home as place of occurrence; Kidney stone; Multiple transverse process fractures; Osteoporosis; Right rib fracture; and Vertebral compression fracture. Procedure: none  Pertinent abnormal Imaging:none, transferred from [facility]  Medications:  Scheduled Meds:   sodium chloride flush  10 mL Intravenous 2 times per day    enoxaparin  40 mg Subcutaneous Daily    therapeutic multivitamin-minerals  1 tablet Oral Daily    primidone  100 mg Oral Nightly    propranolol  80 mg Oral QAM    traZODone  50 mg Oral Nightly    vitamin D  5,000 Units Oral BID    sertraline  150 mg Oral Daily    propranolol  40 mg Oral Nightly     Continuous Infusions:   lactated ringers 125 mL/hr at 08/05/18 2254      Pertinent Info/Orders/Treatment Plan:  Lipase 359.7, IV fluids, pain control, PT/OT, GI Consult telemetry. Diet: Diet NPO Effective Now Exceptions are: Ice Chips   DVT Prophylaxis: Lovenox  Smoking status:  reports that she quit smoking about 23 years ago. She has a 2.00 pack-year smoking history.  She has never used smokeless tobacco.   Influenza Vaccination Screening Completed: yes  Pneumonia Vaccination Screening Completed: yes  PCP: Areli Arreola MD  Readmission: no  Readmission Risk Score: 9%    Discharge Planning  Current Residence:  Private Residence  Living Arrangements:  Spouse/Significant Other   Support Systems:  Spouse/Significant Other, Family Members  Current Services PTA:     Potential Assistance Needed:  N/A  Potential Assistance Purchasing Medications:  No  Does patient want to participate in local

## 2018-08-06 NOTE — PROGRESS NOTES
[address] 62  OCCUPATIONAL THERAPY MISSED TREATMENT NOTE  [facility] 6K  6K-21/021-A      Date: 2018  Patient Name: Juan José Sellres        CSN: [id number]   : 1940  (78 y.o.)  Gender: female   Referring Practitioner: Madhu Burk MD  Diagnosis: acute pancreatitis          REASON FOR MISSED TREATMENT:  Missed Treat. Per physical therapy pt is at baseline for ADLs and mobility, Will discontinue OT orders at this time, please reorder if new needs arise.

## 2018-08-07 PROCEDURE — 99232 SBSQ HOSP IP/OBS MODERATE 35: CPT | Performed by: INTERNAL MEDICINE

## 2018-08-07 PROCEDURE — 6360000002 HC RX W HCPCS: Performed by: INTERNAL MEDICINE

## 2018-08-07 PROCEDURE — 1200000000 HC SEMI PRIVATE

## 2018-08-07 PROCEDURE — 6370000000 HC RX 637 (ALT 250 FOR IP): Performed by: INTERNAL MEDICINE

## 2018-08-07 PROCEDURE — 2580000003 HC RX 258: Performed by: INTERNAL MEDICINE

## 2018-08-07 PROCEDURE — 0DJ08ZZ INSPECTION OF UPPER INTESTINAL TRACT, VIA NATURAL OR ARTIFICIAL OPENING ENDOSCOPIC: ICD-10-PCS | Performed by: INTERNAL MEDICINE

## 2018-08-07 PROCEDURE — C1753 CATH, INTRAVAS ULTRASOUND: HCPCS | Performed by: INTERNAL MEDICINE

## 2018-08-07 PROCEDURE — 3609018700 HC ENDOSCOPIC ULTRASOUND: Performed by: INTERNAL MEDICINE

## 2018-08-07 PROCEDURE — 2709999900 HC NON-CHARGEABLE SUPPLY: Performed by: INTERNAL MEDICINE

## 2018-08-07 PROCEDURE — 99152 MOD SED SAME PHYS/QHP 5/>YRS: CPT | Performed by: INTERNAL MEDICINE

## 2018-08-07 RX ORDER — FENTANYL CITRATE 50 UG/ML
INJECTION, SOLUTION INTRAMUSCULAR; INTRAVENOUS PRN
Status: DISCONTINUED | OUTPATIENT
Start: 2018-08-07 | End: 2018-08-07 | Stop reason: HOSPADM

## 2018-08-07 RX ORDER — MIDAZOLAM HYDROCHLORIDE 1 MG/ML
INJECTION INTRAMUSCULAR; INTRAVENOUS PRN
Status: DISCONTINUED | OUTPATIENT
Start: 2018-08-07 | End: 2018-08-07 | Stop reason: HOSPADM

## 2018-08-07 RX ADMIN — SODIUM CHLORIDE, POTASSIUM CHLORIDE, SODIUM LACTATE AND CALCIUM CHLORIDE: 600; 310; 30; 20 INJECTION, SOLUTION INTRAVENOUS at 04:40

## 2018-08-07 RX ADMIN — PRIMIDONE 100 MG: 50 TABLET ORAL at 21:36

## 2018-08-07 RX ADMIN — SODIUM CHLORIDE, POTASSIUM CHLORIDE, SODIUM LACTATE AND CALCIUM CHLORIDE: 600; 310; 30; 20 INJECTION, SOLUTION INTRAVENOUS at 21:45

## 2018-08-07 RX ADMIN — ENOXAPARIN SODIUM 40 MG: 40 INJECTION SUBCUTANEOUS at 08:31

## 2018-08-07 RX ADMIN — PROPRANOLOL HYDROCHLORIDE 40 MG: 20 TABLET ORAL at 21:36

## 2018-08-07 RX ADMIN — VITAMIN D, TAB 1000IU (100/BT) 5000 UNITS: 25 TAB at 08:31

## 2018-08-07 RX ADMIN — TRAZODONE HYDROCHLORIDE 50 MG: 50 TABLET ORAL at 21:36

## 2018-08-07 RX ADMIN — SODIUM CHLORIDE, POTASSIUM CHLORIDE, SODIUM LACTATE AND CALCIUM CHLORIDE: 600; 310; 30; 20 INJECTION, SOLUTION INTRAVENOUS at 12:49

## 2018-08-07 RX ADMIN — Medication 10 ML: at 21:37

## 2018-08-07 RX ADMIN — SERTRALINE 150 MG: 100 TABLET, FILM COATED ORAL at 08:31

## 2018-08-07 RX ADMIN — VITAMIN D, TAB 1000IU (100/BT) 5000 UNITS: 25 TAB at 21:36

## 2018-08-07 RX ADMIN — MULTIPLE VITAMINS W/ MINERALS TAB 1 TABLET: TAB at 08:31

## 2018-08-07 RX ADMIN — PROPRANOLOL HYDROCHLORIDE 80 MG: 20 TABLET ORAL at 08:31

## 2018-08-07 ASSESSMENT — PAIN SCALES - GENERAL
PAINLEVEL_OUTOF10: 0

## 2018-08-07 ASSESSMENT — PAIN - FUNCTIONAL ASSESSMENT: PAIN_FUNCTIONAL_ASSESSMENT: 0-10

## 2018-08-07 NOTE — OP NOTE
Sedation/Analgesia History & Physical    Patient: Mine Leo : 1940  Med Rec#: [id number] Acc#: [id number]   Provider Performing Procedure: Anabell Olivas  Primary Care Physician: Edis Torres MD    PRE-PROCEDURE   Full CODE [x]Yes  DNR-CCA/DNR-CC []Yes   Brief History/Pre-Procedure Diagnosis:pancreatitis EUS to evaluate etiology           MEDICAL HISTORY         has a past medical history of Acute pancreatitis; Cervical dystonia; Essential tremor; Fall as cause of accidental injury in home as place of occurrence; Kidney stone; Multiple transverse process fractures; Osteoporosis; Right rib fracture; and Vertebral compression fracture. SURGICAL HISTORY   has a past surgical history that includes Hysterectomy; Total knee arthroplasty (Bilateral); Deep brain stimulator placement; Tonsillectomy; and joint replacement (Bilateral).   Additional information:       ALLERGIES   Allergies as of 2018 - Review Complete 2018   Allergen Reaction Noted    Shellfish-derived products  2018     Additional information:       MEDICATIONS   Coumadin Use Last 7 Days [x]No []Yes  Antiplatelet drug therapy use last 7 days  [x]No []Yes  Other anticoagulant use last 7 days  [x]No []Yes    Current Facility-Administered Medications:     benzocaine (HURRICAINE) 20 % oral spray, , , PRN, Tiago Lara MD, 1 each at 18 1428    fentaNYL (SUBLIMAZE) injection, , , PRN, Tiago Lara MD, 50 mcg at 18 1428    midazolam (VERSED) injection, , , PRN, Tiago Lara MD, 1 mg at 18 1430    lactated ringers infusion, , Intravenous, Continuous, Elizabeth Hubbard MD, Last Rate: 125 mL/hr at 18 1249    sodium chloride flush 0.9 % injection 10 mL, 10 mL, Intravenous, 2 times per day, Elizabeth Hubbard MD, 10 mL at 18 2146    sodium chloride flush 0.9 % injection 10 mL, 10 mL, Intravenous, PRN, Elizabeth Hubbard MD    acetaminophen (TYLENOL) tablet 650 mg, 650 mg, Oral, Q4H PRN, [place] Duel

## 2018-08-07 NOTE — OP NOTE
SMV, hepatic artery, SV, and portal vein. Lymph Nodes:  None noted. COMPLICATIONS:  There were no unplanned events during the procedure. POST PROCEDURE CONCLUSIONS:    Ampullary stenosis , with dilated CBD and PD . Resolving acute pancreatitis . Distended GB . RECOMMENDATIONS:  ERCP with sphincterotomy tomorrow .         Specimens: were not obtained    (The following sections must be completed)  Post-Sedation Vital Signs: Vital signs were reviewed and were stable after the procedure (see flow sheet for vitals)            Post-Sedation Exam: Lungs: clear           Complications: none        Electronically signed by Oralia Glass MD on 8/7/2018 at 2:50 PM

## 2018-08-08 ENCOUNTER — ANESTHESIA EVENT (OUTPATIENT)
Dept: ENDOSCOPY | Age: 78
DRG: 439 | End: 2018-08-08
Payer: MEDICARE

## 2018-08-08 ENCOUNTER — APPOINTMENT (OUTPATIENT)
Dept: GENERAL RADIOLOGY | Age: 78
DRG: 439 | End: 2018-08-08
Attending: INTERNAL MEDICINE
Payer: MEDICARE

## 2018-08-08 ENCOUNTER — ANESTHESIA (OUTPATIENT)
Dept: ENDOSCOPY | Age: 78
DRG: 439 | End: 2018-08-08
Payer: MEDICARE

## 2018-08-08 VITALS
DIASTOLIC BLOOD PRESSURE: 78 MMHG | RESPIRATION RATE: 22 BRPM | SYSTOLIC BLOOD PRESSURE: 192 MMHG | OXYGEN SATURATION: 99 %

## 2018-08-08 LAB
ANION GAP SERPL CALCULATED.3IONS-SCNC: 15 MEQ/L (ref 8–16)
BUN BLDV-MCNC: 6 MG/DL (ref 7–22)
CALCIUM SERPL-MCNC: 9.3 MG/DL (ref 8.5–10.5)
CHLORIDE BLD-SCNC: 103 MEQ/L (ref 98–111)
CO2: 22 MEQ/L (ref 23–33)
CREAT SERPL-MCNC: 0.7 MG/DL (ref 0.4–1.2)
GFR SERPL CREATININE-BSD FRML MDRD: 81 ML/MIN/1.73M2
GLUCOSE BLD-MCNC: 85 MG/DL (ref 70–108)
INR BLD: 1 (ref 0.85–1.13)
LIPASE: 40.1 U/L (ref 5.6–51.3)
POTASSIUM SERPL-SCNC: 3.9 MEQ/L (ref 3.5–5.2)
SODIUM BLD-SCNC: 140 MEQ/L (ref 135–145)

## 2018-08-08 PROCEDURE — 6370000000 HC RX 637 (ALT 250 FOR IP): Performed by: INTERNAL MEDICINE

## 2018-08-08 PROCEDURE — 85610 PROTHROMBIN TIME: CPT

## 2018-08-08 PROCEDURE — 3609015200 HC ERCP REMOVE CALCULI/DEBRIS BILIARY/PANCREAS DUCT: Performed by: INTERNAL MEDICINE

## 2018-08-08 PROCEDURE — 80048 BASIC METABOLIC PNL TOTAL CA: CPT

## 2018-08-08 PROCEDURE — 2580000003 HC RX 258: Performed by: INTERNAL MEDICINE

## 2018-08-08 PROCEDURE — 2709999900 HC NON-CHARGEABLE SUPPLY: Performed by: INTERNAL MEDICINE

## 2018-08-08 PROCEDURE — 1200000000 HC SEMI PRIVATE

## 2018-08-08 PROCEDURE — 74328 X-RAY BILE DUCT ENDOSCOPY: CPT

## 2018-08-08 PROCEDURE — 83690 ASSAY OF LIPASE: CPT

## 2018-08-08 PROCEDURE — 2500000003 HC RX 250 WO HCPCS: Performed by: NURSE ANESTHETIST, CERTIFIED REGISTERED

## 2018-08-08 PROCEDURE — 6360000002 HC RX W HCPCS: Performed by: NURSE ANESTHETIST, CERTIFIED REGISTERED

## 2018-08-08 PROCEDURE — 3609014300 HC ERCP BALLOON DILATE BILIARY/PANC DUCT/AMPULLA EA: Performed by: INTERNAL MEDICINE

## 2018-08-08 PROCEDURE — C1769 GUIDE WIRE: HCPCS | Performed by: INTERNAL MEDICINE

## 2018-08-08 PROCEDURE — 36415 COLL VENOUS BLD VENIPUNCTURE: CPT

## 2018-08-08 PROCEDURE — BF131ZZ FLUOROSCOPY OF GALLBLADDER AND BILE DUCTS USING LOW OSMOLAR CONTRAST: ICD-10-PCS | Performed by: INTERNAL MEDICINE

## 2018-08-08 PROCEDURE — 3700000000 HC ANESTHESIA ATTENDED CARE: Performed by: INTERNAL MEDICINE

## 2018-08-08 PROCEDURE — 0F788ZZ DILATION OF CYSTIC DUCT, VIA NATURAL OR ARTIFICIAL OPENING ENDOSCOPIC: ICD-10-PCS | Performed by: INTERNAL MEDICINE

## 2018-08-08 PROCEDURE — 7100000000 HC PACU RECOVERY - FIRST 15 MIN: Performed by: INTERNAL MEDICINE

## 2018-08-08 PROCEDURE — 7100000001 HC PACU RECOVERY - ADDTL 15 MIN: Performed by: INTERNAL MEDICINE

## 2018-08-08 PROCEDURE — 6360000002 HC RX W HCPCS: Performed by: INTERNAL MEDICINE

## 2018-08-08 PROCEDURE — 3700000001 HC ADD 15 MINUTES (ANESTHESIA): Performed by: INTERNAL MEDICINE

## 2018-08-08 PROCEDURE — 0F798ZZ DILATION OF COMMON BILE DUCT, VIA NATURAL OR ARTIFICIAL OPENING ENDOSCOPIC: ICD-10-PCS | Performed by: INTERNAL MEDICINE

## 2018-08-08 PROCEDURE — 3609014900 HC ERCP W/SPHINCTEROTOMY &/OR PAPILLOTOMY: Performed by: INTERNAL MEDICINE

## 2018-08-08 PROCEDURE — 2720000010 HC SURG SUPPLY STERILE: Performed by: INTERNAL MEDICINE

## 2018-08-08 PROCEDURE — 2500000003 HC RX 250 WO HCPCS: Performed by: INTERNAL MEDICINE

## 2018-08-08 PROCEDURE — 99232 SBSQ HOSP IP/OBS MODERATE 35: CPT | Performed by: INTERNAL MEDICINE

## 2018-08-08 RX ORDER — PROPOFOL 10 MG/ML
INJECTION, EMULSION INTRAVENOUS CONTINUOUS PRN
Status: DISCONTINUED | OUTPATIENT
Start: 2018-08-08 | End: 2018-08-08 | Stop reason: SDUPTHER

## 2018-08-08 RX ORDER — DEXTROSE, SODIUM CHLORIDE, AND POTASSIUM CHLORIDE 5; .45; .15 G/100ML; G/100ML; G/100ML
INJECTION INTRAVENOUS CONTINUOUS
Status: DISCONTINUED | OUTPATIENT
Start: 2018-08-08 | End: 2018-08-09

## 2018-08-08 RX ORDER — PROPOFOL 10 MG/ML
INJECTION, EMULSION INTRAVENOUS PRN
Status: DISCONTINUED | OUTPATIENT
Start: 2018-08-08 | End: 2018-08-08 | Stop reason: SDUPTHER

## 2018-08-08 RX ORDER — HYDRALAZINE HYDROCHLORIDE 20 MG/ML
10 INJECTION INTRAMUSCULAR; INTRAVENOUS EVERY 6 HOURS PRN
Status: DISCONTINUED | OUTPATIENT
Start: 2018-08-08 | End: 2018-08-08 | Stop reason: RX

## 2018-08-08 RX ORDER — CIPROFLOXACIN 2 MG/ML
400 INJECTION, SOLUTION INTRAVENOUS ONCE
Status: COMPLETED | OUTPATIENT
Start: 2018-08-08 | End: 2018-08-08

## 2018-08-08 RX ORDER — AMLODIPINE BESYLATE 5 MG/1
5 TABLET ORAL ONCE
Status: COMPLETED | OUTPATIENT
Start: 2018-08-08 | End: 2018-08-08

## 2018-08-08 RX ORDER — LIDOCAINE HYDROCHLORIDE 20 MG/ML
INJECTION, SOLUTION INFILTRATION; PERINEURAL PRN
Status: DISCONTINUED | OUTPATIENT
Start: 2018-08-08 | End: 2018-08-08 | Stop reason: SDUPTHER

## 2018-08-08 RX ADMIN — CIPROFLOXACIN 400 MG: 2 INJECTION, SOLUTION INTRAVENOUS at 12:59

## 2018-08-08 RX ADMIN — MORPHINE SULFATE 4 MG: 4 INJECTION INTRAVENOUS at 18:47

## 2018-08-08 RX ADMIN — SODIUM CHLORIDE, POTASSIUM CHLORIDE, SODIUM LACTATE AND CALCIUM CHLORIDE: 600; 310; 30; 20 INJECTION, SOLUTION INTRAVENOUS at 05:14

## 2018-08-08 RX ADMIN — PRIMIDONE 100 MG: 50 TABLET ORAL at 21:44

## 2018-08-08 RX ADMIN — TRAZODONE HYDROCHLORIDE 50 MG: 50 TABLET ORAL at 23:14

## 2018-08-08 RX ADMIN — LIDOCAINE HYDROCHLORIDE 100 MG: 20 INJECTION, SOLUTION INFILTRATION; PERINEURAL at 14:17

## 2018-08-08 RX ADMIN — SERTRALINE 150 MG: 100 TABLET, FILM COATED ORAL at 08:59

## 2018-08-08 RX ADMIN — VITAMIN D, TAB 1000IU (100/BT) 5000 UNITS: 25 TAB at 21:44

## 2018-08-08 RX ADMIN — SODIUM CHLORIDE, POTASSIUM CHLORIDE, SODIUM LACTATE AND CALCIUM CHLORIDE: 600; 310; 30; 20 INJECTION, SOLUTION INTRAVENOUS at 13:06

## 2018-08-08 RX ADMIN — PROPOFOL 120 MCG/KG/MIN: 10 INJECTION, EMULSION INTRAVENOUS at 14:20

## 2018-08-08 RX ADMIN — VITAMIN D, TAB 1000IU (100/BT) 5000 UNITS: 25 TAB at 08:58

## 2018-08-08 RX ADMIN — SODIUM CHLORIDE, POTASSIUM CHLORIDE, SODIUM LACTATE AND CALCIUM CHLORIDE: 600; 310; 30; 20 INJECTION, SOLUTION INTRAVENOUS at 14:15

## 2018-08-08 RX ADMIN — PROPRANOLOL HYDROCHLORIDE 40 MG: 20 TABLET ORAL at 21:44

## 2018-08-08 RX ADMIN — PROPOFOL 50 MG: 10 INJECTION, EMULSION INTRAVENOUS at 14:19

## 2018-08-08 RX ADMIN — MULTIPLE VITAMINS W/ MINERALS TAB 1 TABLET: TAB at 08:58

## 2018-08-08 RX ADMIN — INDOMETHACIN 50 MG: 50 SUPPOSITORY RECTAL at 14:42

## 2018-08-08 RX ADMIN — PROPRANOLOL HYDROCHLORIDE 80 MG: 20 TABLET ORAL at 08:59

## 2018-08-08 RX ADMIN — POTASSIUM CHLORIDE, DEXTROSE MONOHYDRATE AND SODIUM CHLORIDE: 150; 5; 450 INJECTION, SOLUTION INTRAVENOUS at 16:18

## 2018-08-08 RX ADMIN — PROPOFOL 50 MG: 10 INJECTION, EMULSION INTRAVENOUS at 14:17

## 2018-08-08 RX ADMIN — AMLODIPINE BESYLATE 5 MG: 5 TABLET ORAL at 17:40

## 2018-08-08 ASSESSMENT — PAIN SCALES - GENERAL
PAINLEVEL_OUTOF10: 0
PAINLEVEL_OUTOF10: 7
PAINLEVEL_OUTOF10: 0
PAINLEVEL_OUTOF10: 3

## 2018-08-08 ASSESSMENT — PAIN DESCRIPTION - ORIENTATION: ORIENTATION: MID

## 2018-08-08 ASSESSMENT — PAIN DESCRIPTION - LOCATION: LOCATION: CHEST

## 2018-08-08 ASSESSMENT — PAIN - FUNCTIONAL ASSESSMENT: PAIN_FUNCTIONAL_ASSESSMENT: 0-10

## 2018-08-08 ASSESSMENT — PAIN DESCRIPTION - PAIN TYPE: TYPE: ACUTE PAIN

## 2018-08-08 ASSESSMENT — PAIN DESCRIPTION - DESCRIPTORS: DESCRIPTORS: ACHING

## 2018-08-08 ASSESSMENT — PAIN DESCRIPTION - FREQUENCY: FREQUENCY: CONTINUOUS

## 2018-08-08 NOTE — CARE COORDINATION
8/8/18, 11:44 AM      Kiko Douglas day: 4  Location: On license of [facility]/021 Reason for admit: Acute pancreatitis, unspecified complication status, unspecified pancreatitis type [K85.90]   Procedure: EUS 8/7/18 showing ampullary stenosis, dilated CBD 9 mm, Dilated PD at 5 mm, resolving pancreatitis, distended gallbladder. Treatment Plan of Care: ERCP with sphincterotomy pending, IV fluids continue, pain control, PT/OT.   PCP: Petra Mclean MD  Readmission Risk Score: 9%  Discharge Plan: plan is home with spouse at discharge

## 2018-08-08 NOTE — ANESTHESIA POSTPROCEDURE EVALUATION
Department of Anesthesiology  Postprocedure Note    Patient: Jaswinder Gautam  MRN: [id number]  YOB: 1940  Date of evaluation: 8/8/2018  Time:  2:33 PM     Procedure Summary     Date:  08/08/18 Room / Location:  [facility] / [facility]    Anesthesia Start:  9652 Anesthesia Stop:  4305    Procedure:  ERCP SPINCTER/PAPILLOTOMY (N/A ) Diagnosis:  (pancreatitis)    Surgeon:  Jennifer Youssef MD Responsible Provider:  Aysha Pires DO    Anesthesia Type:  MAC ASA Status:  2          Anesthesia Type: MAC    Royer Phase I: Royer Score: 10    Royer Phase II: Royer Score: 10    Last vitals: Reviewed and per EMR flowsheets.        Anesthesia Post Evaluation    Patient location during evaluation: bedside  Patient participation: complete - patient cannot participate  Level of consciousness: awake  Pain score: 0  Airway patency: patent  Nausea & Vomiting: no vomiting and no nausea  Complications: no  Cardiovascular status: hemodynamically stable  Respiratory status: acceptable  Hydration status: stable

## 2018-08-08 NOTE — PROGRESS NOTES
Total of 20ml Iothalamate meglumine Injection 60% used for ERCP. Lot [id number]. [facility], 6/17. Exp 5/2019.

## 2018-08-08 NOTE — PROGRESS NOTES
Hospitalist Progress Note    Patient:  Kam Abrams      Unit/Bed:6K-21/021-A    YOB: 1940    MRN: [id number]       Acct: [de-identified]     PCP: Maykel Cloud MD    Date of Admission: 8/4/2018    Chief Complaint:  Left flank pain    Patient had pain in the left flank area, intermittent, started around 11:30 PM on the 2nd( Thursday) without any nausea or vomiting, severe with little relief in between but the pain never resolved. She could not fall asleep and was testing and turning and her  woke up in the morning at 5 AM and noticed that still to be in pain and did not sleep and took her to the emergency room. She did take 2 tablets of Vicodin before going around 5:45 AM and by the time she went to the ER had pain has improved. Workup did not show anything except that the lipase was elevated at 359. Ultrasound of the abdomen was also fairly normal.  Patient was admitted to the hospital at [facility] and was conservatively treated initially on day 1 and diet was advanced to clear liquids on day 2 and she had reoccurrence of the pain and was transferred here for GI eval.   She could not get MRCP - has brain stimulator      Hospital Course:     Was treated conservatively with IV hydration, nothing by mouth, and  GI evaluated the patient and had endoscopic ultrasound and was noticed to have ampullary stenosis with dilation of the pancreatic- 5mm and  CBD- 9mm  And distended GB and also noticed changes consistent with pancreatitis that is resolving.  -Lipase trended down from 359 to 50. GI planning for ERCP tomorrow and sphincterotomy  afternoon, Discussed with Dr. Tien Gomez    8/8- patient had ERCP and sphincterotomy -biliary  and sludge was extracted , keep nothing by mouth tonight, gentle hydration, clear liquids in a.m. And advance, and if tolerates diet well, discharged tomorrow.      Subjective:   Patient did not have any pain and was looking forward to going home prior to the

## 2018-08-08 NOTE — PROGRESS NOTES
ERCP showed ampullary stenosis , dilated CBD s/p sphincterotomy and balloon sweep x3 . NPO today , clear liquid in AM and advance as tolerated , GI follow up in 2 weeks .

## 2018-08-08 NOTE — OP NOTE
Q6H PRN, Jeanine Fuchs MD    enoxaparin (LOVENOX) injection 40 mg, 40 mg, Subcutaneous, Daily, Jeanine Fuchs MD, Stopped at 08/08/18 0900    morphine injection 2 mg, 2 mg, Intravenous, Q4H PRN **OR** morphine (PF) injection 4 mg, 4 mg, Intravenous, Q4H PRN, Jeanine Fuchs MD, 4 mg at 08/04/18 2026    therapeutic multivitamin-minerals 1 tablet, 1 tablet, Oral, Daily, Jeanine Fuchs MD, 1 tablet at 08/08/18 0858    primidone (MYSOLINE) tablet 100 mg, 100 mg, Oral, Nightly, Jeanine Fuchs MD, 100 mg at 08/07/18 2136    propranolol (INDERAL) tablet 80 mg, 80 mg, Oral, QAM, Jeanine Fuchs MD, 80 mg at 08/08/18 0859    traZODone (DESYREL) tablet 50 mg, 50 mg, Oral, Nightly, Jeanine Fuchs MD, 50 mg at 08/07/18 2136    vitamin D (CHOLECALCIFEROL) tablet 5,000 Units, 5,000 Units, Oral, BID, Jeanine Fuchs MD, 5,000 Units at 08/08/18 0858    sertraline (ZOLOFT) tablet 150 mg, 150 mg, Oral, Daily, Jeanine Fuchs MD, 150 mg at 08/08/18 0859    propranolol (INDERAL) tablet 40 mg, 40 mg, Oral, Nightly, Jeanine Fuchs MD, 40 mg at 08/07/18 2136  Prior to Admission medications    Medication Sig Start Date End Date Taking? Authorizing Provider   triamterene-hydrochlorothiazide (MAXZIDE-25) 37.5-25 MG per tablet Take 1 tablet by mouth daily   Yes Historical Provider, MD   traMADol (ULTRAM) 50 MG tablet Take 50 mg by mouth every 6 hours as needed for Pain. .   Yes Historical Provider, MD   LORazepam (ATIVAN) 0.5 MG tablet Take 0.5 mg by mouth daily as needed for Anxiety. .   Yes Historical Provider, MD   vitamin D (CHOLECALCIFEROL) 5000 units CAPS capsule Take 5,000 Units by mouth 2 times daily   Yes Historical Provider, MD   [place]-3 1000 MG CAPS Take 1 capsule by mouth daily   Yes Historical Provider, MD   Multiple Vitamins-Minerals (THERAPEUTIC MULTIVITAMIN-MINERALS) tablet Take 1 tablet by mouth daily   Yes Historical Provider, MD   Acetaminophen 650 MG TABS Take 650 mg by mouth agree to proceed. SEDATION  Planned agent:[x]Midazolam []Meperidine [x]Sublimaze []Morphine  []Diazepam  []Propofol   ASA Classification: Class 3 - A patient with severe systemic disease that limits activity but is not incapacitating    Monitoring and Safety: The patient will be placed on a cardiac monitor and vital signs, pulse oximetry and level of consciousness will be continuously evaluated throughout the procedure. The patient will be closely monitored until recovery from the medications is complete and the patient has returned to baseline status. Respiratory therapy will be on standby during the procedure. [x]Pre-procedure diagnostic studies complete and results available. Comment:    [x]Previous sedation/anesthesia experiences assessed. Comment:    [x]The patient is an appropriate candidate to undergo the planned procedure sedation and anesthesia. (Refer to nursing sedation/analgesia documentation record)  [x]Formulation and discussion of sedation/procedure plan, risks, and expectations with patient and/or responsible adult completed. [x]Patient examined immediately prior to the procedure.  (Refer to nursing sedation/analgesia documentation record)    Leonor Betancourt MD   Electronically signed 8/8/2018

## 2018-08-08 NOTE — ANESTHESIA PRE PROCEDURE
Department of Anesthesiology  Preprocedure Note       Name:  Jaswinder Gautam   Age:  78 y.o.  :  1940                                          MRN:  [id number]         Date:  2018      Surgeon: Rachana Lindquist):  Jennifer Youssef MD    Procedure: Procedure(s):  ERCP SPINCTER/PAPILLOTOMY    Medications prior to admission:   Prior to Admission medications    Medication Sig Start Date End Date Taking? Authorizing Provider   triamterene-hydrochlorothiazide (MAXZIDE-25) 37.5-25 MG per tablet Take 1 tablet by mouth daily   Yes Historical Provider, MD   traMADol (ULTRAM) 50 MG tablet Take 50 mg by mouth every 6 hours as needed for Pain. .   Yes Historical Provider, MD   LORazepam (ATIVAN) 0.5 MG tablet Take 0.5 mg by mouth daily as needed for Anxiety. .   Yes Historical Provider, MD   vitamin D (CHOLECALCIFEROL) 5000 units CAPS capsule Take 5,000 Units by mouth 2 times daily   Yes Historical Provider, MD   [place]-3 1000 MG CAPS Take 1 capsule by mouth daily   Yes Historical Provider, MD   Multiple Vitamins-Minerals (THERAPEUTIC MULTIVITAMIN-MINERALS) tablet Take 1 tablet by mouth daily   Yes Historical Provider, MD   Acetaminophen 650 MG TABS Take 650 mg by mouth every 4 hours as needed 8/13/15  Yes Argelia Simpers, APRN - CNP   docusate (COLACE, DULCOLAX) 100 MG CAPS Take 100 mg by mouth 2 times daily as needed  18  Yes Argelia Simpers, APRN - CNP   sertraline (ZOLOFT) 100 MG tablet Take 150 mg by mouth daily   Yes Historical Provider, MD   primidone (MYSOLINE) 50 MG tablet Take 100 mg by mouth nightly   Yes Historical Provider, MD   propranolol (INDERAL) 40 MG tablet Take 80 mg by mouth every morning   Yes Historical Provider, MD   propranolol (INDERAL) 40 MG tablet Take 40 mg by mouth nightly   Yes Historical Provider, MD   traZODone (DESYREL) 50 MG tablet Take 50 mg by mouth nightly    Yes Historical Provider, MD       Current medications:    Current Facility-Administered Medications   Medication Dose Route Frequency Provider Last Rate Last Dose    lactated ringers infusion   Intravenous Continuous Cleve Arteaga  mL/hr at 08/08/18 1306      sodium chloride flush 0.9 % injection 10 mL  10 mL Intravenous 2 times per day Cleve Arteaga MD   10 mL at 08/07/18 2137    sodium chloride flush 0.9 % injection 10 mL  10 mL Intravenous PRN Cleve Arteaga MD        acetaminophen (TYLENOL) tablet 650 mg  650 mg Oral Q4H PRN Cleve Arteaga MD        ondansetron [facility]) injection 4 mg  4 mg Intravenous Q6H PRN Cleve Arteaga MD        enoxaparin (LOVENOX) injection 40 mg  40 mg Subcutaneous Daily Cleve Arteaga MD   Stopped at 08/08/18 0900    morphine injection 2 mg  2 mg Intravenous Q4H PRN Cleve Arteaga MD        Or   [place] morphine (PF) injection 4 mg  4 mg Intravenous Q4H PRN Cleve Arteaga MD   4 mg at 08/04/18 2026    therapeutic multivitamin-minerals 1 tablet  1 tablet Oral Daily Cleve Arteaga MD   1 tablet at 08/08/18 0858    primidone (MYSOLINE) tablet 100 mg  100 mg Oral Nightly Cleve Arteaga MD   100 mg at 08/07/18 2136    propranolol (INDERAL) tablet 80 mg  80 mg Oral QAM Cleve Arteaga MD   80 mg at 08/08/18 0859    traZODone (DESYREL) tablet 50 mg  50 mg Oral Nightly Cleve Arteaga MD   50 mg at 08/07/18 2136    vitamin D (CHOLECALCIFEROL) tablet 5,000 Units  5,000 Units Oral BID Cleve Arteaga MD   5,000 Units at 08/08/18 0858    sertraline (ZOLOFT) tablet 150 mg  150 mg Oral Daily Cleve Arteaga MD   150 mg at 08/08/18 0859    propranolol (INDERAL) tablet 40 mg  40 mg Oral Nightly Cleve Arteaga MD   40 mg at 08/07/18 2136       Allergies:     Allergies   Allergen Reactions    Shellfish-Derived Products        Problem List:    Patient Active Problem List   Diagnosis Code    Pneumothorax J93.9    Closed fracture of lumbar vertebra ([address] S32.009A    Closed fracture of rib S22.39XA    Trauma T14.90XA    Pain aggravated by activities of daily living R52    Pain R52    HTN (hypertension) I10    Tremor R25.1    Essential tremor G25.0    Acute pancreatitis K85.90    Kidney stone N20.0    Osteoporosis M81.0       Past Medical History:        Diagnosis Date    Acute pancreatitis     Cervical dystonia     Receives B[facility] at [facility] hypertension     Essential tremor     DBS    Fall as cause of accidental injury in home as place of occurrence     Kidney stone     Multiple transverse process fractures     right L1 and L3 transverse processes    Osteoporosis     Right rib fracture     5,6,7,9,10    Vertebral compression fracture     superior endplate of T2 and T3       Past Surgical History:        Procedure Laterality Date    DEEP BRAIN STIMULATOR PLACEMENT      HYSTERECTOMY      JOINT REPLACEMENT Bilateral     knee replacement    PA OFFICE/OUTPT VISIT,PROCEDURE ONLY Left 8/7/2018    ENDOSCOPIC ULTRASOUND performed by Baltazar Hogan MD at [facility]    TONSILLECTOMY      TOTAL KNEE ARTHROPLASTY Bilateral        Social History:    Social History   Substance Use Topics    Smoking status: Former Smoker     Packs/day: 0.10     Years: 20.00     Quit date: 1/1/1995    Smokeless tobacco: Never Used      Comment: used to smoke socially off and on    Alcohol use Yes      Comment: seldom                                Counseling given: Not Answered      Vital Signs (Current):   Vitals:    08/07/18 2130 08/08/18 0512 08/08/18 0848 08/08/18 1204   BP: (!) 126/94 (!) 152/84 (!) 158/69 (!) 163/70   Pulse: 70 68 63 61   Resp: 16 18 18 18   Temp: 36.8 °C (98.3 °F) 36.9 °C (98.4 °F) 37.1 °C (98.8 °F) 37 °C (98.6 °F)   TempSrc: Oral Oral Oral Oral   SpO2: 96% 96% 96% 97%   Weight:  167 lb 1.6 oz (75.8 kg)     Height:                                                  BP Readings from Last 3 Encounters:   08/08/18 (!) 163/70   08/14/15 130/70   08/06/15 129/60       NPO Status: Time of last liquid consumption: 2300 Time of last solid consumption: 1700                        Date of last liquid consumption: 08/06/18                        Date of last solid food consumption: 08/02/18    BMI:   Wt Readings from Last 3 Encounters:   08/08/18 167 lb 1.6 oz (75.8 kg)   08/13/15 189 lb (85.7 kg)   08/06/15 193 lb 14.4 oz (88 kg)     Body mass index is 26.97 kg/m². CBC:   Lab Results   Component Value Date    WBC 4.8 08/06/2018    RBC 3.77 08/06/2018    HGB 10.3 08/06/2018    HCT 31.4 08/06/2018    MCV 83.3 08/06/2018    RDW 13.6 08/07/2015     08/06/2018       CMP:   Lab Results   Component Value Date     08/08/2018    K 3.9 08/08/2018    K 3.7 08/06/2018     08/08/2018    CO2 22 08/08/2018    BUN 6 08/08/2018    CREATININE 0.7 08/08/2018    LABGLOM 81 08/08/2018    GLUCOSE 85 08/08/2018    PROT 6.0 08/06/2018    CALCIUM 9.3 08/08/2018    BILITOT 0.2 08/06/2018    ALKPHOS 126 08/06/2018    AST 20 08/06/2018    ALT 15 08/06/2018       POC Tests: No results for input(s): POCGLU, POCNA, POCK, POCCL, POCBUN, POCHEMO, POCHCT in the last 72 hours.     Coags:   Lab Results   Component Value Date    INR 1.00 08/08/2018       HCG (If Applicable): No results found for: PREGTESTUR, PREGSERUM, HCG, HCGQUANT     ABGs: No results found for: PHART, PO2ART, ZCI5LQQ, LNC3XAU, BEART, O0YIKKSY     Type & Screen (If Applicable):  No results found for: [facility]    Anesthesia Evaluation  Patient summary reviewed  Airway: Mallampati: II  TM distance: >3 FB     Mouth opening: > = 3 FB Dental:          Pulmonary:Negative Pulmonary ROS and normal exam  breath sounds clear to auscultation                             Cardiovascular:    (+) hypertension: moderate,         Rhythm: regular  Rate: normal                    Neuro/Psych:                ROS comment: Non-essential tremors  GI/Hepatic/Renal:   (+) GERD: well controlled,           Endo/Other: Negative Endo/Other ROS             Pt had no PAT visit       Abdominal:

## 2018-08-09 LAB
ANION GAP SERPL CALCULATED.3IONS-SCNC: 11 MEQ/L (ref 8–16)
BUN BLDV-MCNC: 12 MG/DL (ref 7–22)
CALCIUM SERPL-MCNC: 9 MG/DL (ref 8.5–10.5)
CHLORIDE BLD-SCNC: 102 MEQ/L (ref 98–111)
CO2: 24 MEQ/L (ref 23–33)
CREAT SERPL-MCNC: 0.6 MG/DL (ref 0.4–1.2)
ERYTHROCYTE [DISTWIDTH] IN BLOOD BY AUTOMATED COUNT: 14 % (ref 11.5–14.5)
ERYTHROCYTE [DISTWIDTH] IN BLOOD BY AUTOMATED COUNT: 41.8 FL (ref 35–45)
GFR SERPL CREATININE-BSD FRML MDRD: > 90 ML/MIN/1.73M2
GLUCOSE BLD-MCNC: 106 MG/DL (ref 70–108)
HCT VFR BLD CALC: 30.2 % (ref 37–47)
HEMOGLOBIN: 10.1 GM/DL (ref 12–16)
LIPASE: 60.9 U/L (ref 5.6–51.3)
MCH RBC QN AUTO: 27.7 PG (ref 26–33)
MCHC RBC AUTO-ENTMCNC: 33.4 GM/DL (ref 32.2–35.5)
MCV RBC AUTO: 82.7 FL (ref 81–99)
PLATELET # BLD: 237 THOU/MM3 (ref 130–400)
PMV BLD AUTO: 10.5 FL (ref 9.4–12.4)
POTASSIUM SERPL-SCNC: 3.5 MEQ/L (ref 3.5–5.2)
RBC # BLD: 3.65 MILL/MM3 (ref 4.2–5.4)
SODIUM BLD-SCNC: 137 MEQ/L (ref 135–145)
WBC # BLD: 4.5 THOU/MM3 (ref 4.8–10.8)

## 2018-08-09 PROCEDURE — 85027 COMPLETE CBC AUTOMATED: CPT

## 2018-08-09 PROCEDURE — 6370000000 HC RX 637 (ALT 250 FOR IP): Performed by: INTERNAL MEDICINE

## 2018-08-09 PROCEDURE — 1200000000 HC SEMI PRIVATE

## 2018-08-09 PROCEDURE — 6360000002 HC RX W HCPCS: Performed by: INTERNAL MEDICINE

## 2018-08-09 PROCEDURE — 2580000003 HC RX 258: Performed by: INTERNAL MEDICINE

## 2018-08-09 PROCEDURE — 36415 COLL VENOUS BLD VENIPUNCTURE: CPT

## 2018-08-09 PROCEDURE — 80048 BASIC METABOLIC PNL TOTAL CA: CPT

## 2018-08-09 PROCEDURE — 2500000003 HC RX 250 WO HCPCS: Performed by: INTERNAL MEDICINE

## 2018-08-09 PROCEDURE — 83690 ASSAY OF LIPASE: CPT

## 2018-08-09 PROCEDURE — 99232 SBSQ HOSP IP/OBS MODERATE 35: CPT | Performed by: INTERNAL MEDICINE

## 2018-08-09 RX ORDER — POTASSIUM CHLORIDE 20 MEQ/1
40 TABLET, EXTENDED RELEASE ORAL 2 TIMES DAILY WITH MEALS
Status: DISCONTINUED | OUTPATIENT
Start: 2018-08-09 | End: 2018-08-10 | Stop reason: HOSPADM

## 2018-08-09 RX ORDER — TRIAMTERENE AND HYDROCHLOROTHIAZIDE 37.5; 25 MG/1; MG/1
0.5 TABLET ORAL DAILY
Qty: 30 TABLET | Refills: 3
Start: 2018-08-09

## 2018-08-09 RX ORDER — POLYETHYLENE GLYCOL 3350 17 G/17G
238 POWDER, FOR SOLUTION ORAL ONCE
Status: COMPLETED | OUTPATIENT
Start: 2018-08-09 | End: 2018-08-09

## 2018-08-09 RX ORDER — SENNA PLUS 8.6 MG/1
15 TABLET ORAL ONCE
Status: COMPLETED | OUTPATIENT
Start: 2018-08-09 | End: 2018-08-09

## 2018-08-09 RX ADMIN — TRAZODONE HYDROCHLORIDE 50 MG: 50 TABLET ORAL at 21:39

## 2018-08-09 RX ADMIN — POLYETHYLENE GLYCOL 3350 238 G: 17 POWDER, FOR SOLUTION ORAL at 11:19

## 2018-08-09 RX ADMIN — POTASSIUM CHLORIDE 40 MEQ: 1500 TABLET, EXTENDED RELEASE ORAL at 08:51

## 2018-08-09 RX ADMIN — ACETAMINOPHEN 650 MG: 325 TABLET ORAL at 21:46

## 2018-08-09 RX ADMIN — PROPRANOLOL HYDROCHLORIDE 40 MG: 20 TABLET ORAL at 21:39

## 2018-08-09 RX ADMIN — PRIMIDONE 100 MG: 50 TABLET ORAL at 21:39

## 2018-08-09 RX ADMIN — Medication 10 ML: at 21:42

## 2018-08-09 RX ADMIN — MULTIPLE VITAMINS W/ MINERALS TAB 1 TABLET: TAB at 08:48

## 2018-08-09 RX ADMIN — PROPRANOLOL HYDROCHLORIDE 80 MG: 20 TABLET ORAL at 08:48

## 2018-08-09 RX ADMIN — VITAMIN D, TAB 1000IU (100/BT) 5000 UNITS: 25 TAB at 21:39

## 2018-08-09 RX ADMIN — POTASSIUM CHLORIDE 40 MEQ: 1500 TABLET, EXTENDED RELEASE ORAL at 17:37

## 2018-08-09 RX ADMIN — POTASSIUM CHLORIDE, DEXTROSE MONOHYDRATE AND SODIUM CHLORIDE: 150; 5; 450 INJECTION, SOLUTION INTRAVENOUS at 06:26

## 2018-08-09 RX ADMIN — SENNA 129 MG: 8.6 TABLET, COATED ORAL at 11:18

## 2018-08-09 RX ADMIN — SERTRALINE 150 MG: 100 TABLET, FILM COATED ORAL at 08:48

## 2018-08-09 RX ADMIN — VITAMIN D, TAB 1000IU (100/BT) 5000 UNITS: 25 TAB at 08:48

## 2018-08-09 RX ADMIN — MORPHINE SULFATE 2 MG: 2 INJECTION, SOLUTION INTRAMUSCULAR; INTRAVENOUS at 22:42

## 2018-08-09 ASSESSMENT — PAIN SCALES - GENERAL
PAINLEVEL_OUTOF10: 3
PAINLEVEL_OUTOF10: 3
PAINLEVEL_OUTOF10: 0
PAINLEVEL_OUTOF10: 6

## 2018-08-09 NOTE — FLOWSHEET NOTE
08/08/18 1650   Provider Notification   Reason for Communication Review case  (Elevated BP)   Provider Name Triston Armas   Provider Notification Physician   Method of Communication Secure Message   Response See orders   Notified of BP. Order received for hydralazine 10mg IV Q 6 hours prn for SBP>160. Pharmacy then spoke with him do to shortage and order changed to norvasc 5 mg PO once.
soon.    Plan: This staff provided a listening presence and encouraging atmosphere for this patient to explore her jarad life. This staff encouraged her investigation into the scripture as her main resource of how to live. She was very thankful for the visit and asked that we continue to provide her with support if she has to continue her stay. This  assured her we would continue to challenge her spiritual growth throughout her stay.

## 2018-08-09 NOTE — PROGRESS NOTES
Gastroenterology  Progress Note    8/9/2018 3:10 PM  Subjective:   Admit Date: 8/4/2018    Interval History: diarrhea rectal bleeding , HB 10.2 , abdominal pain resolved , colonoscopy tomorrow   Diet: DIET CLEAR LIQUID;    Medications:   Scheduled Meds:   potassium chloride  40 mEq Oral BID WC    sodium chloride flush  10 mL Intravenous 2 times per day    therapeutic multivitamin-minerals  1 tablet Oral Daily    primidone  100 mg Oral Nightly    propranolol  80 mg Oral QAM    traZODone  50 mg Oral Nightly    vitamin D  5,000 Units Oral BID    sertraline  150 mg Oral Daily    propranolol  40 mg Oral Nightly     Continuous Infusions:  CBC:   Recent Labs      08/09/18   1021   WBC  4.5*   HGB  10.1*   PLT  237     BMP:  Recent Labs      08/08/18   0519  08/09/18   0426   NA  140  137   K  3.9  3.5   CL  103  102   CO2  22*  24   BUN  6*  12   CREATININE  0.7  0.6   GLUCOSE  85  106     Hepatic: No results for input(s): AST, ALT, ALB, BILITOT, ALKPHOS in the last 72 hours. INR:   Recent Labs      08/08/18   0723   INR  1.00     Xray: same  Endoscopy Finding:  Ampullary stenosis . Objective:   Vitals: BP (!) 153/68   Pulse 80   Temp 98 °F (36.7 °C) (Oral)   Resp 16   Ht 5' 6\" (1.676 m)   Wt 164 lb 14.5 oz (74.8 kg)   LMP  (LMP Unknown)   SpO2 98%   BMI 26.62 kg/m²     Intake/Output Summary (Last 24 hours) at 08/09/18 1510  Last data filed at 08/09/18 0416   Gross per 24 hour   Intake          1278.53 ml   Output                0 ml   Net          1278.53 ml     General appearance: alert and cooperative with exam  Lungs: clear to auscultation bilaterally  Heart: regular rate and rhythm, S1, S2 normal, no murmur, click, rub or gallop  Abdomen: soft, non-tender; bowel sounds normal; no masses,  no organomegaly  Extremities: extremities normal, atraumatic, no cyanosis or edema    Assessment and Plan:   1.  Diarrhea, rectal bleeding , colonoscopy tomorrow , CBC in AM       Follow up in GI Clinic after discharge in 2 week(s)    Patient Active Problem List:     Pneumothorax     Closed fracture of lumbar vertebra (HCC)     Closed fracture of rib     Trauma     Pain aggravated by activities of daily living     Pain     HTN (hypertension)     Tremor     Essential tremor     Acute pancreatitis     Kidney stone     Osteoporosis      Leonor Betancourt MD

## 2018-08-09 NOTE — PLAN OF CARE
Problem: Pain:  Goal: Pain level will decrease  Pain level will decrease   Outcome: Ongoing  Patient denies any pain. Will continue to monitor. Goal: Control of acute pain  Control of acute pain   Outcome: Met This Shift    Goal: Control of chronic pain  Control of chronic pain   Outcome: Met This Shift    Goal: Patient's pain/discomfort is manageable  Patient's pain/discomfort is manageable   Outcome: Met This Shift      Problem: Safety:  Goal: Free from accidental physical injury  Free from accidental physical injury   Outcome: Ongoing  Bed alarm on, Falling star program in place. Call light within reach. Problem: Daily Care:  Goal: Daily care needs are met  Daily care needs are met   Outcome: Ongoing  Assist with ADLs as needed. Problem: Skin Integrity:  Goal: Skin integrity will stabilize  Skin integrity will stabilize   Outcome: Met This Shift      Problem: Discharge Planning:  Goal: Patients continuum of care needs are met  Patients continuum of care needs are met   Outcome: Ongoing  Patient plans to return home at discharge. Lives with . Problem: Neurological  Goal: Maximum potential motor/sensory/cognitive function  Outcome: Met This Shift      Problem: Cardiovascular  Goal: No DVT, peripheral vascular complications  Outcome: Ongoing  No signs of DVT. Will continue to monitor. Problem: Respiratory  Goal: No pulmonary complications  Outcome: Met This Shift      Problem: GI  Goal: No bowel complications  Outcome: Not Met This Shift  Noticed bloody stool plan for Colonoscopy tomorrow     Problem:   Goal: Adequate urinary output  Outcome: Met This Shift      Comments: Bed alarm on, Falling star program in place. Call light within reach.

## 2018-08-09 NOTE — PROGRESS NOTES
clear liquids and did well and had several episodes of watery diarrhea last night, ? Etiology not sure as she had episodes at home too, patient was discharged but after that she had a bowel movement and noticed blood bright red blood with clots without any abdominal pain, discharge canceled, GI consulted again for diarrhea and also bleeding, initially it was felt that the patient probably has some bleeding from the ERCP but because of the ongoing diarrhea even at home intermittently, colonoscopy was planned for tomorrow. Hemoglobin is 10.1 ? Dilutional, stop fluids, possible discharge tomorrow based on colonoscopy    Subjective:   Denies any pain in the abdomen, had several episodes of watery diarrhea, no nausea or vomiting, apparently has been having these episodes of diarrhea intermittently at home      Medications:  Reviewed    Infusion Medications    dextrose 5% and 0.45% NaCl with KCl 20 mEq 75 mL/hr at 08/09/18 0626     Scheduled Medications    potassium chloride  40 mEq Oral BID WC    sodium chloride flush  10 mL Intravenous 2 times per day    enoxaparin  40 mg Subcutaneous Daily    therapeutic multivitamin-minerals  1 tablet Oral Daily    primidone  100 mg Oral Nightly    propranolol  80 mg Oral QAM    traZODone  50 mg Oral Nightly    vitamin D  5,000 Units Oral BID    sertraline  150 mg Oral Daily    propranolol  40 mg Oral Nightly     PRN Meds: sodium chloride flush, acetaminophen, ondansetron, morphine **OR** morphine      Intake/Output Summary (Last 24 hours) at 08/09/18 0841  Last data filed at 08/09/18 0416   Gross per 24 hour   Intake          2554.53 ml   Output              700 ml   Net          1854.53 ml       Diet:  DIET LOW FAT;     Exam:  BP (!) 124/58   Pulse 60   Temp 98.4 °F (36.9 °C) (Oral)   Resp 16   Ht 5' 6\" (1.676 m)   Wt 164 lb 14.5 oz (74.8 kg)   LMP  (LMP Unknown)   SpO2 95%   BMI 26.62 kg/m²     Physical Examination:   General appearance - alert, awake  and in no [] Macrina       [] Other-    Code Status: Full Code    PT/OT Eval Status: none    Assessment/Plan:    Anticipated Discharge in :  Tomorrow    Bright red blood per rectum- ?  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

## 2018-08-09 NOTE — PROGRESS NOTES
Patient had another loose bloody stool. Dr. Beny White plans for Colonoscopy tomorrow at 3pm. Patient updated, started bowel prep.

## 2018-08-10 VITALS
SYSTOLIC BLOOD PRESSURE: 151 MMHG | TEMPERATURE: 98.7 F | HEIGHT: 66 IN | WEIGHT: 166.1 LBS | OXYGEN SATURATION: 98 % | HEART RATE: 68 BPM | DIASTOLIC BLOOD PRESSURE: 64 MMHG | RESPIRATION RATE: 16 BRPM | BODY MASS INDEX: 26.69 KG/M2

## 2018-08-10 LAB
ANION GAP SERPL CALCULATED.3IONS-SCNC: 11 MEQ/L (ref 8–16)
BUN BLDV-MCNC: 6 MG/DL (ref 7–22)
CALCIUM SERPL-MCNC: 9.6 MG/DL (ref 8.5–10.5)
CHLORIDE BLD-SCNC: 104 MEQ/L (ref 98–111)
CO2: 26 MEQ/L (ref 23–33)
CREAT SERPL-MCNC: 0.7 MG/DL (ref 0.4–1.2)
ERYTHROCYTE [DISTWIDTH] IN BLOOD BY AUTOMATED COUNT: 14.2 % (ref 11.5–14.5)
ERYTHROCYTE [DISTWIDTH] IN BLOOD BY AUTOMATED COUNT: 43.6 FL (ref 35–45)
GFR SERPL CREATININE-BSD FRML MDRD: 81 ML/MIN/1.73M2
GLUCOSE BLD-MCNC: 92 MG/DL (ref 70–108)
HCT VFR BLD CALC: 28.4 % (ref 37–47)
HEMOGLOBIN: 9.4 GM/DL (ref 12–16)
MCH RBC QN AUTO: 28 PG (ref 26–33)
MCHC RBC AUTO-ENTMCNC: 33.1 GM/DL (ref 32.2–35.5)
MCV RBC AUTO: 84.5 FL (ref 81–99)
PLATELET # BLD: 239 THOU/MM3 (ref 130–400)
PMV BLD AUTO: 11 FL (ref 9.4–12.4)
POTASSIUM SERPL-SCNC: 3.7 MEQ/L (ref 3.5–5.2)
RBC # BLD: 3.36 MILL/MM3 (ref 4.2–5.4)
SODIUM BLD-SCNC: 141 MEQ/L (ref 135–145)
WBC # BLD: 5.6 THOU/MM3 (ref 4.8–10.8)

## 2018-08-10 PROCEDURE — 6370000000 HC RX 637 (ALT 250 FOR IP): Performed by: INTERNAL MEDICINE

## 2018-08-10 PROCEDURE — 80048 BASIC METABOLIC PNL TOTAL CA: CPT

## 2018-08-10 PROCEDURE — 85027 COMPLETE CBC AUTOMATED: CPT

## 2018-08-10 PROCEDURE — 99238 HOSP IP/OBS DSCHRG MGMT 30/<: CPT | Performed by: INTERNAL MEDICINE

## 2018-08-10 PROCEDURE — 3609009500 HC COLONOSCOPY DIAGNOSTIC OR SCREENING: Performed by: INTERNAL MEDICINE

## 2018-08-10 PROCEDURE — 0DJD8ZZ INSPECTION OF LOWER INTESTINAL TRACT, VIA NATURAL OR ARTIFICIAL OPENING ENDOSCOPIC: ICD-10-PCS | Performed by: INTERNAL MEDICINE

## 2018-08-10 PROCEDURE — 99152 MOD SED SAME PHYS/QHP 5/>YRS: CPT | Performed by: INTERNAL MEDICINE

## 2018-08-10 PROCEDURE — 6360000002 HC RX W HCPCS: Performed by: INTERNAL MEDICINE

## 2018-08-10 PROCEDURE — 2580000003 HC RX 258: Performed by: INTERNAL MEDICINE

## 2018-08-10 PROCEDURE — 36415 COLL VENOUS BLD VENIPUNCTURE: CPT

## 2018-08-10 RX ORDER — MIDAZOLAM HYDROCHLORIDE 1 MG/ML
INJECTION INTRAMUSCULAR; INTRAVENOUS PRN
Status: DISCONTINUED | OUTPATIENT
Start: 2018-08-10 | End: 2018-08-10 | Stop reason: HOSPADM

## 2018-08-10 RX ORDER — FENTANYL CITRATE 50 UG/ML
INJECTION, SOLUTION INTRAMUSCULAR; INTRAVENOUS PRN
Status: DISCONTINUED | OUTPATIENT
Start: 2018-08-10 | End: 2018-08-10 | Stop reason: HOSPADM

## 2018-08-10 RX ADMIN — VITAMIN D, TAB 1000IU (100/BT) 5000 UNITS: 25 TAB at 09:26

## 2018-08-10 RX ADMIN — SERTRALINE 150 MG: 100 TABLET, FILM COATED ORAL at 09:26

## 2018-08-10 RX ADMIN — ACETAMINOPHEN 650 MG: 325 TABLET ORAL at 18:15

## 2018-08-10 RX ADMIN — Medication 10 ML: at 09:26

## 2018-08-10 RX ADMIN — MULTIPLE VITAMINS W/ MINERALS TAB 1 TABLET: TAB at 09:25

## 2018-08-10 RX ADMIN — PROPRANOLOL HYDROCHLORIDE 80 MG: 20 TABLET ORAL at 09:26

## 2018-08-10 RX ADMIN — POTASSIUM CHLORIDE 40 MEQ: 1500 TABLET, EXTENDED RELEASE ORAL at 09:26

## 2018-08-10 ASSESSMENT — PAIN SCALES - GENERAL
PAINLEVEL_OUTOF10: 0
PAINLEVEL_OUTOF10: 7
PAINLEVEL_OUTOF10: 0

## 2018-08-10 NOTE — PROGRESS NOTES
Patient back from Endo. Doctors are ok with discharge. Patient does not want to eat before she leaves.

## 2018-08-10 NOTE — OP NOTE
Sedation/Analgesia History & Physical    Patient: Olivia Huffman : 1940  Med Rec#: [id number] Acc#: [id number]   Provider Performing Procedure: Angelique Harrison  Primary Care Physician: Pardeep Wisdom MD    PRE-PROCEDURE   Full CODE [x]Yes  DNR-CCA/DNR-CC []Yes   Brief History/Pre-Procedure Diagnosis:rectal bleeding, diarrhea          MEDICAL HISTORY         has a past medical history of Acute pancreatitis; Cervical dystonia; Essential hypertension; Essential tremor; Fall as cause of accidental injury in home as place of occurrence; Kidney stone; Multiple transverse process fractures; Osteoporosis; Right rib fracture; and Vertebral compression fracture. SURGICAL HISTORY   has a past surgical history that includes Hysterectomy; Total knee arthroplasty (Bilateral); Deep brain stimulator placement; Tonsillectomy; joint replacement (Bilateral); pr office/outpt visit,procedure only (Left, 2018); pr ercp w/sphincterotomy/papillotomy (N/A, 2018); ERCP (2018); and ERCP (2018).   Additional information:       ALLERGIES   Allergies as of 2018 - Review Complete 2018   Allergen Reaction Noted    Shellfish-derived products  2018     Additional information:       MEDICATIONS   Coumadin Use Last 7 Days [x]No []Yes  Antiplatelet drug therapy use last 7 days  [x]No []Yes  Other anticoagulant use last 7 days  [x]No []Yes    Current Facility-Administered Medications:     fentaNYL (SUBLIMAZE) injection, , , PRN, Heather Lockett MD, 25 mcg at 08/10/18 1653    midazolam (VERSED) injection, , , PRN, Heather Lockett MD, 1 mg at 08/10/18 1653    potassium chloride (KLOR-CON M) extended release tablet 40 mEq, 40 mEq, Oral, BID WC, Amber K Belkis Adkins MD, 40 mEq at 08/10/18 0926    sodium chloride flush 0.9 % injection 10 mL, 10 mL, Intravenous, 2 times per day, Barbara Chase MD, 10 mL at 08/10/18 0926    sodium chloride flush 0.9 % injection 10 mL, 10 mL, Intravenous, PRN, Barbara Chase, MD    acetaminophen (TYLENOL) tablet 650 mg, 650 mg, Oral, Q4H PRN, Troy Kennedy MD, 650 mg at 08/09/18 2146    ondansetron (ZOFRAN) injection 4 mg, 4 mg, Intravenous, Q6H PRN, Troy Kennedy MD    morphine injection 2 mg, 2 mg, Intravenous, Q4H PRN, 2 mg at 08/09/18 2242 **OR** morphine (PF) injection 4 mg, 4 mg, Intravenous, Q4H PRN, Troy Kennedy MD, 4 mg at 08/08/18 1847    therapeutic multivitamin-minerals 1 tablet, 1 tablet, Oral, Daily, Troy Kennedy MD, 1 tablet at 08/10/18 0150    primidone (MYSOLINE) tablet 100 mg, 100 mg, Oral, Nightly, Troy Kennedy MD, 100 mg at 08/09/18 2139    propranolol (INDERAL) tablet 80 mg, 80 mg, Oral, QAM, Troy Kennedy MD, 80 mg at 08/10/18 7315    traZODone (DESYREL) tablet 50 mg, 50 mg, Oral, Nightly, Troy Kennedy MD, 50 mg at 08/09/18 2139    vitamin D (CHOLECALCIFEROL) tablet 5,000 Units, 5,000 Units, Oral, BID, Troy Kennedy MD, 5,000 Units at 08/10/18 7691    sertraline (ZOLOFT) tablet 150 mg, 150 mg, Oral, Daily, Troy Kennedy MD, 150 mg at 08/10/18 3295    propranolol (INDERAL) tablet 40 mg, 40 mg, Oral, Nightly, Troy Kennedy MD, 40 mg at 08/09/18 2139  Prior to Admission medications    Medication Sig Start Date End Date Taking? Authorizing Provider   triamterene-hydrochlorothiazide (MAXZIDE-25) 37.5-25 MG per tablet Take 0.5 tablets by mouth daily 8/9/18  Yes Macario Castro MD   traMADol (ULTRAM) 50 MG tablet Take 50 mg by mouth every 6 hours as needed for Pain. .   Yes Historical Provider, MD   LORazepam (ATIVAN) 0.5 MG tablet Take 0.5 mg by mouth daily as needed for Anxiety. .   Yes Historical Provider, MD   vitamin D (CHOLECALCIFEROL) 5000 units CAPS capsule Take 5,000 Units by mouth 2 times daily   Yes Historical Provider, MD   [place]-3 1000 MG CAPS Take 1 capsule by mouth daily   Yes Historical Provider, MD   Multiple Vitamins-Minerals (THERAPEUTIC MULTIVITAMIN-MINERALS) tablet Take 1 tablet by mouth daily   Yes Historical Provider, MD   Acetaminophen 650 MG TABS Take 650 mg by mouth every 4 hours as needed 8/13/15  Yes DALE Zapien - CNP   sertraline (ZOLOFT) 100 MG tablet Take 150 mg by mouth daily   Yes Historical Provider, MD   primidone (MYSOLINE) 50 MG tablet Take 100 mg by mouth nightly   Yes Historical Provider, MD   propranolol (INDERAL) 40 MG tablet Take 80 mg by mouth every morning   Yes Historical Provider, MD   propranolol (INDERAL) 40 MG tablet Take 40 mg by mouth nightly   Yes Historical Provider, MD   traZODone (DESYREL) 50 MG tablet Take 50 mg by mouth nightly    Yes Historical Provider, MD     Additional information:       PHYSICAL:   Heart:  [x]Regular rate and rhythm  []Other:    Lungs:  [x]Clear    []Other:    Abdomen: [x]Soft    []Other:    Mental Status: [x]Alert & Oriented  []Other:      VITAL SIGNS   Patient Vitals for the past 24 hrs:   BP Temp Temp src Pulse Resp SpO2 Weight   08/10/18 1708 (!) 117/54 - - - - - -   08/10/18 1707 - - - 66 - 98 % -   08/10/18 1706 (!) 118/57 - - 66 - 98 % -   08/10/18 1702 139/63 - - 64 11 99 % -   08/10/18 1659 (!) 123/57 - - 61 14 99 % -   08/10/18 1657 126/61 - - 61 12 99 % -   08/10/18 1654 (!) 148/65 - - 57 (!) 6 99 % -   08/10/18 1651 (!) 175/70 - - 64 9 100 % -   08/10/18 1649 (!) 77/37 - - 65 14 100 % -   08/10/18 1511 (!) 149/65 98.7 °F (37.1 °C) Temporal 62 18 96 % -   08/10/18 1126 135/64 98.5 °F (36.9 °C) Oral 61 16 98 % -   08/10/18 0909 129/60 98 °F (36.7 °C) Oral 65 18 98 % -   08/10/18 0428 (!) 110/53 98.4 °F (36.9 °C) Oral 62 15 96 % 166 lb 1.6 oz (75.3 kg)   08/09/18 2130 (!) 174/76 99.1 °F (37.3 °C) Oral 69 18 96 % -       PLANNED PROCEDURE   []EGD  [x]Colonoscopy []Flex Sigmoid     Consent: I have discussed with the patient and/or the patient representative the indication, alternatives, and the possible risks and/or complications of the planned procedure and the anesthesia methods.  The patient and/or patient representative

## 2018-08-10 NOTE — OP NOTE
Sedation/Analgesia History & Physical    Patient: Bernarda Amaya: 1940  Med Rec#: [id number] Acc#: [id number]   Provider Performing Procedure: Gracie Russo  Primary Care Physician: Melodie Vincent MD    PRE-PROCEDURE   Full CODE [x]Yes  DNR-CCA/DNR-CC []Yes   Brief History/Pre-Procedure Diagnosis:rectal bleeding , diarrhea . MEDICAL HISTORY         has a past medical history of Acute pancreatitis; Cervical dystonia; Essential hypertension; Essential tremor; Fall as cause of accidental injury in home as place of occurrence; Kidney stone; Multiple transverse process fractures; Osteoporosis; Right rib fracture; and Vertebral compression fracture. SURGICAL HISTORY   has a past surgical history that includes Hysterectomy; Total knee arthroplasty (Bilateral); Deep brain stimulator placement; Tonsillectomy; joint replacement (Bilateral); pr office/outpt visit,procedure only (Left, 8/7/2018); pr ercp w/sphincterotomy/papillotomy (N/A, 8/8/2018); ERCP (8/8/2018); and ERCP (8/8/2018).   Additional information:       ALLERGIES   Allergies as of 08/04/2018 - Review Complete 08/04/2018   Allergen Reaction Noted    Shellfish-derived products  08/04/2018     Additional information:       MEDICATIONS   Coumadin Use Last 7 Days [x]No []Yes  Antiplatelet drug therapy use last 7 days  [x]No []Yes  Other anticoagulant use last 7 days  [x]No []Yes    Current Facility-Administered Medications:     fentaNYL (SUBLIMAZE) injection, , , PRN, Jorge Garcia MD, 25 mcg at 08/10/18 1653    midazolam (VERSED) injection, , , PRN, Jorge Garcia MD, 1 mg at 08/10/18 1653    potassium chloride (KLOR-CON M) extended release tablet 40 mEq, 40 mEq, Oral, BID WC, Amber Liang MD, 40 mEq at 08/10/18 0926    sodium chloride flush 0.9 % injection 10 mL, 10 mL, Intravenous, 2 times per day, Cheyenne Santiago MD, 10 mL at 08/10/18 0926    sodium chloride flush 0.9 % injection 10 mL, 10 mL, Intravenous, PRN, Ernesto Canela agent: [x]Midazolam []Meperidine [x]Sublimaze []Morphine  []Diazepam  []Propofol   ASA Classification: Class 3 - A patient with severe systemic disease that limits activity but is not incapacitating    Monitoring and Safety: The patient will be placed on a cardiac monitor and vital signs, pulse oximetry and level of consciousness will be continuously evaluated throughout the procedure. The patient will be closely monitored until recovery from the medications is complete and the patient has returned to baseline status. Respiratory therapy will be on standby during the procedure. [x]Pre-procedure diagnostic studies complete and results available. Comment:    [x]Previous sedation/anesthesia experiences assessed. Comment:    [x]The patient is an appropriate candidate to undergo the planned procedure sedation and anesthesia. (Refer to nursing sedation/analgesia documentation record)  [x]Formulation and discussion of sedation/procedure plan, risks, and expectations with patient and/or responsible adult completed. [x]Patient examined immediately prior to the procedure.  (Refer to nursing sedation/analgesia documentation record)    Bradford Nina MD   Electronically signed 8/10/2018

## 2018-08-10 NOTE — OP NOTE
PROCEDURE NOTE    DATE OF PROCEDURE: 8/10/2018    ENDOSCOPIST: Mamadou Miranda MD, MS    ASSISTANT: Tom    PREOPERATIVE DIAGNOSIS: rectal bleeding  hematochezia  diarrhea    POSTOPERATIVE DIAGNOSIS: diverticulosis,  Marked in degree, involving the splenic flexure, the descending colon and the sigmoid  hemorrhoids internal, Large in size    OPERATION: Colonoscopy --diagnostic    ANESTHESIA:  3 mg of Versed in increment and 75 Mcg of Fentanyl in increment. ESTIMATED BLOOD LOSS: No    COMPLICATIONS: None. SPECIMENS: were not obtained    HISTORY: The patient is a 78y.o. year old female with history of above preop diagnosis. Colonoscopy with possible biopsy or polypectomy has been recommended and I explained the risk, benefits, expected outcome, and alternatives to the procedure. Risks included but are not limited to bleeding, infection, respiratory distress, hypotension, and perforation of the colon. The patient understands and is in agreement. PROCEDURE: The patient was given monitored anesthesia care. The patient was given oxygen by nasal cannula. The colonoscope was inserted per rectum and advanced under direct vision to the cecum without difficulty. Findings:  Cecum/Ascending colon: normal    Transverse colon: normal    Descending/Sigmoid colon: diverticulosis    Rectum/Anus: examined in normal and retroflexed positions and was hemorrhoids . The colon was decompressed and the scope was removed. The patient tolerated the procedure well. Current guidelines call for a repeat colonoscopy in 10 years.       Electronically signed by Jabier Crum MD  on 8/10/2018 at 5:07 PM   Cc: Nahomy Groves

## 2018-08-10 NOTE — DISCHARGE SUMMARY
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 Hospital Course:   Blair Ramirez is a 78 y.o. female admitted to [address] [place] [address] on 2018 for Left flank pain     Patient had pain in the left flank area, intermittent, started around 11:30 PM on the ( Thursday) without any nausea or vomiting, severe with little relief in between but the pain never resolved. She could not fall asleep and was testing and turning and her  woke up in the morning at 5 AM and noticed that still to be in pain and did not sleep and took her to the emergency room. She did take 2 tablets of Vicodin before going around 5:45 AM and by the time she went to the ER had pain has improved.     Workup did not show anything except that the lipase was elevated at 359.   Ultrasound of the abdomen was also fairly normal.  Patient was admitted to the hospital at [facility] and was conservatively treated initially on day 1 and diet was advanced to clear liquids on day 2 and she had reoccurrence of the pain and was transferred here for GI eval.   She could not get MRCP - has brain stimulator    Patient was admitted to the hospital and was conservatively treated with IV hydration and nothing by mouth and gastroenterologist evaluated the patient with endoscopic ultrasound and was noticed to have ampullary stenosis with dilation of the pancreatic duct and the dilation of the CBD. Also there were changes consistent with pancreatitis and gallbladder was little distended. With conservative treatment of symptoms improved and lipase trended down to 50. Patient had ERCP on 8/8 and had Sphincterotomy and biliary sludge was extracted and there were no obvious stones. Next day diet was advanced to clear liquids and she tolerated well but overnight she did have multiple episodes of watery diarrhea and stated even at home she has been having intermittent episodes of watery diarrhea and then when she had a bowel movement she had blood with large clots. She had bowel prep done the next day and had colonoscopy done and was noticed to have diverticulosis and large internal hemorrhoids and there is suspicion that she might have bled from her hemorrhoids. There is mild drop in the hemoglobin though it could be a combination of bleeding and dilution from fluids. Recommended the patient to take iron supplementation and also her home at pressure medications have been decreased. Patient needs to follow with GI in office in 2 weeks and follow biopsy reports of the colon.        Exam:     Vitals:  Vitals:    08/10/18 0428 08/10/18 0909 08/10/18 1126 08/10/18 1511   BP: (!) 110/53 129/60 135/64 (!) 149/65   Pulse: 62 65 61 62   Resp: 15 18 16 18   Temp: 98.4 °F (36.9 °C) 98 °F (36.7 °C) 98.5 °F (36.9 °C) 98.7 °F (37.1 °C)   TempSrc: Oral Oral Oral Temporal   SpO2: 96% 98% 98% 96%   Weight: 166 lb 1.6 oz (75.3 kg)      Height:         Weight: Weight: 166 lb 1.6 oz (75.3 kg)     24 hour

## (undated) DEVICE — SUREFIT, DUAL DISPERSIVE ELECTRODE, CONTACT QUALITY MONITOR: Brand: SUREFIT

## (undated) DEVICE — ENDO KIT: Brand: MEDLINE INDUSTRIES, INC.

## (undated) DEVICE — SET LNR RED GRN W/ BASE CLEANASCOPE

## (undated) DEVICE — RETRIEVAL BALLOON CATHETER: Brand: EXTRACTOR™ PRO RX

## (undated) DEVICE — SOLUTION IV 1000ML 0.45% SOD CHL PH 5 INJ USP VIAFLX PLAS

## (undated) DEVICE — LINER SUCT CANSTR 1500CC SEMI RIG W/ POR HYDROPHOBIC SHUT

## (undated) DEVICE — CONMED SCOPE SAVER BITE BLOCK, 20X27 MM: Brand: SCOPE SAVER

## (undated) DEVICE — SPHINCTEROTOME: Brand: HYDRATOME RX 44

## (undated) DEVICE — CONTAINER,SPECIMEN,PNEU TUBE,4OZ,OR STRL: Brand: MEDLINE

## (undated) DEVICE — Device: Brand: ENDO SMARTCAP

## (undated) DEVICE — SOLUTION IRRIG 250ML STRL H2O PLAS POUR BTL USP

## (undated) DEVICE — Device: Brand: BALLOON3

## (undated) DEVICE — DEVICE LCK BILI RAP EXCHG W/O BX CAP

## (undated) DEVICE — SET ADMIN 25ML L117IN PMP MOD CK VLV RLER CLMP 2 SMRTSITE

## (undated) DEVICE — Device